# Patient Record
Sex: MALE | Race: WHITE | Employment: OTHER | URBAN - METROPOLITAN AREA
[De-identification: names, ages, dates, MRNs, and addresses within clinical notes are randomized per-mention and may not be internally consistent; named-entity substitution may affect disease eponyms.]

---

## 2019-08-29 ENCOUNTER — APPOINTMENT (OUTPATIENT)
Dept: GENERAL RADIOLOGY | Age: 64
End: 2019-08-29
Payer: COMMERCIAL

## 2019-08-29 ENCOUNTER — HOSPITAL ENCOUNTER (EMERGENCY)
Age: 64
Discharge: HOME | End: 2019-08-29
Attending: EMERGENCY MEDICINE
Payer: COMMERCIAL

## 2019-08-29 VITALS
SYSTOLIC BLOOD PRESSURE: 154 MMHG | BODY MASS INDEX: 26.15 KG/M2 | OXYGEN SATURATION: 97 % | RESPIRATION RATE: 22 BRPM | WEIGHT: 186.8 LBS | HEIGHT: 71 IN | DIASTOLIC BLOOD PRESSURE: 87 MMHG | HEART RATE: 92 BPM | TEMPERATURE: 97.9 F

## 2019-08-29 DIAGNOSIS — J44.1 COPD EXACERBATION (HCC): Primary | ICD-10-CM

## 2019-08-29 DIAGNOSIS — E86.0 DEHYDRATION: ICD-10-CM

## 2019-08-29 LAB
ANION GAP SERPL CALCULATED.3IONS-SCNC: 16 MMOL/L (ref 3–16)
BASE EXCESS VENOUS: -0.1 MMOL/L (ref -2–3)
BASOPHILS ABSOLUTE: 0.1 K/UL (ref 0–0.2)
BASOPHILS RELATIVE PERCENT: 0.8 %
BUN BLDV-MCNC: 14 MG/DL (ref 7–20)
CALCIUM SERPL-MCNC: 9.8 MG/DL (ref 8.3–10.6)
CARBOXYHEMOGLOBIN: 4.2 % (ref 0–1.5)
CHLORIDE BLD-SCNC: 100 MMOL/L (ref 99–110)
CO2: 20 MMOL/L (ref 21–32)
CREAT SERPL-MCNC: 0.8 MG/DL (ref 0.8–1.3)
EKG ATRIAL RATE: 117 BPM
EKG DIAGNOSIS: NORMAL
EKG P AXIS: 81 DEGREES
EKG P-R INTERVAL: 156 MS
EKG Q-T INTERVAL: 316 MS
EKG QRS DURATION: 78 MS
EKG QTC CALCULATION (BAZETT): 440 MS
EKG R AXIS: 5 DEGREES
EKG T AXIS: 70 DEGREES
EKG VENTRICULAR RATE: 117 BPM
EOSINOPHILS ABSOLUTE: 0.1 K/UL (ref 0–0.6)
EOSINOPHILS RELATIVE PERCENT: 1 %
GFR AFRICAN AMERICAN: >60
GFR NON-AFRICAN AMERICAN: >60
GLUCOSE BLD-MCNC: 110 MG/DL (ref 70–99)
HCO3 VENOUS: 20.4 MMOL/L (ref 24–28)
HCT VFR BLD CALC: 50.1 % (ref 40.5–52.5)
HEMOGLOBIN, VEN, REDUCED: 5.1 %
HEMOGLOBIN: 17.2 G/DL (ref 13.5–17.5)
LACTIC ACID: 2 MMOL/L (ref 0.4–2)
LYMPHOCYTES ABSOLUTE: 2.4 K/UL (ref 1–5.1)
LYMPHOCYTES RELATIVE PERCENT: 23.6 %
MCH RBC QN AUTO: 32.2 PG (ref 26–34)
MCHC RBC AUTO-ENTMCNC: 34.4 G/DL (ref 31–36)
MCV RBC AUTO: 93.8 FL (ref 80–100)
METHEMOGLOBIN VENOUS: 0.3 % (ref 0–1.5)
MONOCYTES ABSOLUTE: 0.9 K/UL (ref 0–1.3)
MONOCYTES RELATIVE PERCENT: 9.2 %
NEUTROPHILS ABSOLUTE: 6.8 K/UL (ref 1.7–7.7)
NEUTROPHILS RELATIVE PERCENT: 65.4 %
O2 SAT, VEN: 95 %
PCO2, VEN: 24.6 MMHG (ref 41–51)
PDW BLD-RTO: 13.5 % (ref 12.4–15.4)
PH VENOUS: 7.53 (ref 7.35–7.45)
PLATELET # BLD: 343 K/UL (ref 135–450)
PMV BLD AUTO: 6.8 FL (ref 5–10.5)
PO2, VEN: 63 MMHG (ref 25–40)
POTASSIUM SERPL-SCNC: 3.5 MMOL/L (ref 3.5–5.1)
PRO-BNP: 279 PG/ML (ref 0–124)
RBC # BLD: 5.34 M/UL (ref 4.2–5.9)
SODIUM BLD-SCNC: 136 MMOL/L (ref 136–145)
TCO2 CALC VENOUS: 21 MMOL/L
TROPONIN: <0.01 NG/ML
WBC # BLD: 10.3 K/UL (ref 4–11)

## 2019-08-29 PROCEDURE — 83605 ASSAY OF LACTIC ACID: CPT

## 2019-08-29 PROCEDURE — 80048 BASIC METABOLIC PNL TOTAL CA: CPT

## 2019-08-29 PROCEDURE — 99285 EMERGENCY DEPT VISIT HI MDM: CPT

## 2019-08-29 PROCEDURE — 71046 X-RAY EXAM CHEST 2 VIEWS: CPT

## 2019-08-29 PROCEDURE — 85025 COMPLETE CBC W/AUTO DIFF WBC: CPT

## 2019-08-29 PROCEDURE — 6370000000 HC RX 637 (ALT 250 FOR IP): Performed by: EMERGENCY MEDICINE

## 2019-08-29 PROCEDURE — 6370000000 HC RX 637 (ALT 250 FOR IP): Performed by: STUDENT IN AN ORGANIZED HEALTH CARE EDUCATION/TRAINING PROGRAM

## 2019-08-29 PROCEDURE — 82803 BLOOD GASES ANY COMBINATION: CPT

## 2019-08-29 PROCEDURE — 84484 ASSAY OF TROPONIN QUANT: CPT

## 2019-08-29 PROCEDURE — 83880 ASSAY OF NATRIURETIC PEPTIDE: CPT

## 2019-08-29 PROCEDURE — 96360 HYDRATION IV INFUSION INIT: CPT

## 2019-08-29 PROCEDURE — 93005 ELECTROCARDIOGRAM TRACING: CPT | Performed by: EMERGENCY MEDICINE

## 2019-08-29 PROCEDURE — 94640 AIRWAY INHALATION TREATMENT: CPT

## 2019-08-29 PROCEDURE — 2580000003 HC RX 258: Performed by: STUDENT IN AN ORGANIZED HEALTH CARE EDUCATION/TRAINING PROGRAM

## 2019-08-29 RX ORDER — AZITHROMYCIN 250 MG/1
250 TABLET, FILM COATED ORAL DAILY
Qty: 4 TABLET | Refills: 0 | Status: SHIPPED | OUTPATIENT
Start: 2019-08-29 | End: 2019-09-02

## 2019-08-29 RX ORDER — PREDNISONE 10 MG/1
TABLET ORAL
Qty: 20 TABLET | Refills: 0 | Status: SHIPPED | OUTPATIENT
Start: 2019-08-29 | End: 2019-09-08

## 2019-08-29 RX ORDER — SODIUM CHLORIDE, SODIUM LACTATE, POTASSIUM CHLORIDE, CALCIUM CHLORIDE 600; 310; 30; 20 MG/100ML; MG/100ML; MG/100ML; MG/100ML
1000 INJECTION, SOLUTION INTRAVENOUS ONCE
Status: COMPLETED | OUTPATIENT
Start: 2019-08-29 | End: 2019-08-29

## 2019-08-29 RX ORDER — ALBUTEROL SULFATE 90 UG/1
2 AEROSOL, METERED RESPIRATORY (INHALATION) EVERY 4 HOURS PRN
Qty: 1 INHALER | Refills: 0 | Status: SHIPPED | OUTPATIENT
Start: 2019-08-29

## 2019-08-29 RX ORDER — PREDNISONE 20 MG/1
60 TABLET ORAL ONCE
Status: DISCONTINUED | OUTPATIENT
Start: 2019-08-29 | End: 2019-08-29

## 2019-08-29 RX ORDER — AZITHROMYCIN 250 MG/1
500 TABLET, FILM COATED ORAL ONCE
Status: COMPLETED | OUTPATIENT
Start: 2019-08-29 | End: 2019-08-29

## 2019-08-29 RX ORDER — PREDNISONE 10 MG/1
10 TABLET ORAL ONCE
Status: COMPLETED | OUTPATIENT
Start: 2019-08-29 | End: 2019-08-29

## 2019-08-29 RX ORDER — IPRATROPIUM BROMIDE AND ALBUTEROL SULFATE 2.5; .5 MG/3ML; MG/3ML
1 SOLUTION RESPIRATORY (INHALATION) ONCE
Status: COMPLETED | OUTPATIENT
Start: 2019-08-29 | End: 2019-08-29

## 2019-08-29 RX ADMIN — IPRATROPIUM BROMIDE AND ALBUTEROL SULFATE 1 AMPULE: .5; 3 SOLUTION RESPIRATORY (INHALATION) at 11:05

## 2019-08-29 RX ADMIN — PREDNISONE 10 MG: 10 TABLET ORAL at 11:18

## 2019-08-29 RX ADMIN — SODIUM CHLORIDE, POTASSIUM CHLORIDE, SODIUM LACTATE AND CALCIUM CHLORIDE 1000 ML: 600; 310; 30; 20 INJECTION, SOLUTION INTRAVENOUS at 11:18

## 2019-08-29 RX ADMIN — AZITHROMYCIN 500 MG: 250 TABLET, FILM COATED ORAL at 12:45

## 2019-08-29 ASSESSMENT — ENCOUNTER SYMPTOMS
COUGH: 1
CHEST TIGHTNESS: 1
EYES NEGATIVE: 1
NAUSEA: 1
SHORTNESS OF BREATH: 1
VOMITING: 1
CONSTIPATION: 1
ABDOMINAL PAIN: 1

## 2019-08-29 NOTE — ED PROVIDER NOTES
Sig: Take 1 tablet by mouth daily for 4 days Next dose tomorrow 8/30     Dispense:  4 tablet     Refill:  0    albuterol sulfate HFA (PROVENTIL HFA) 108 (90 Base) MCG/ACT inhaler     Sig: Inhale 2 puffs into the lungs every 4 hours as needed for Wheezing or Shortness of Breath (Space out to every 6 hours as symptoms improve) Space out to every 6 hours as symptoms improve. Dispense:  1 Inhaler     Refill:  0    predniSONE (DELTASONE) 10 MG tablet     Sig: Take 4 tablets by mouth once daily for 4 days. Next dose tomorrow 8/30     Dispense:  20 tablet     Refill:  0       CONSULTS:  None    MEDICAL DECISION MAKING / ASSESSMENT / Princess Simeon is a 62 y.o. male with a variety of complaints. His presentation is most concerning for abnormal breath sounds including wheezing in the context of a long history of smoking. Additionally his GI complaints including anorexia, vomiting, constipation are concerning for his nutritional status as well as metabolic sequelae. ED course would be directed towards symptomatic control including treating his wheezing. A search for a unifying etiology will also be attempted including basic lab work and a chest x-ray. He is noted to trigger sepsis criteria however there is little evidence of an infectious etiology at this time. He will be given IV fluids and support of his general hydration status. ED Course as of Aug 29 1243   Thu Aug 29, 2019   1128 His initial lab work is resulted. His VBG is notable for alkalemia likely contraction alkalosis. His BNP is elevated; his troponin is negative. There is no leukocytosis on his CBC he is hemoconcentrated; his BMP is without actionable abnormality. [NG]   7625 His chest x-ray is resulted without evidence of pneumonia or pulmonary edema. [NG]   5137 He is reassessed. He says that he is feeling better. His heart rate is noted to be normalizing with rate in the high 90s.     [NG]   4639 His condition is improved and

## 2019-08-29 NOTE — ED NOTES
IV placed. Medication given per order. Will continue to monitor. Pt states that he is breathing better after breathing treatment.       Julieta Kellogg RN  08/29/19 8119

## 2020-02-09 ENCOUNTER — HOSPITAL ENCOUNTER (INPATIENT)
Age: 65
LOS: 2 days | Discharge: HOSPICE | DRG: 054 | End: 2020-02-12
Attending: EMERGENCY MEDICINE | Admitting: INTERNAL MEDICINE
Payer: COMMERCIAL

## 2020-02-09 ENCOUNTER — APPOINTMENT (OUTPATIENT)
Dept: GENERAL RADIOLOGY | Age: 65
DRG: 054 | End: 2020-02-09
Payer: COMMERCIAL

## 2020-02-09 PROBLEM — N39.0 UTI (URINARY TRACT INFECTION): Status: ACTIVE | Noted: 2020-02-09

## 2020-02-09 LAB
A/G RATIO: 1.1 (ref 1.1–2.2)
ALBUMIN SERPL-MCNC: 3.9 G/DL (ref 3.4–5)
ALP BLD-CCNC: 71 U/L (ref 40–129)
ALT SERPL-CCNC: 21 U/L (ref 10–40)
AMORPHOUS: ABNORMAL /HPF
ANION GAP SERPL CALCULATED.3IONS-SCNC: 18 MMOL/L (ref 3–16)
AST SERPL-CCNC: 22 U/L (ref 15–37)
BACTERIA: ABNORMAL /HPF
BANDED NEUTROPHILS RELATIVE PERCENT: 3 % (ref 0–7)
BASOPHILS ABSOLUTE: 0 K/UL (ref 0–0.2)
BASOPHILS RELATIVE PERCENT: 0 %
BILIRUB SERPL-MCNC: 0.5 MG/DL (ref 0–1)
BILIRUBIN URINE: ABNORMAL
BLOOD, URINE: ABNORMAL
BUN BLDV-MCNC: 33 MG/DL (ref 7–20)
CALCIUM SERPL-MCNC: 9.9 MG/DL (ref 8.3–10.6)
CHLORIDE BLD-SCNC: 112 MMOL/L (ref 99–110)
CLARITY: CLEAR
CO2: 19 MMOL/L (ref 21–32)
COLOR: YELLOW
CREAT SERPL-MCNC: 0.8 MG/DL (ref 0.8–1.3)
CRYSTALS, UA: ABNORMAL /HPF
EOSINOPHILS ABSOLUTE: 0 K/UL (ref 0–0.6)
EOSINOPHILS RELATIVE PERCENT: 0 %
EPITHELIAL CELLS, UA: ABNORMAL /HPF
GFR AFRICAN AMERICAN: >60
GFR NON-AFRICAN AMERICAN: >60
GLOBULIN: 3.4 G/DL
GLUCOSE BLD-MCNC: 121 MG/DL (ref 70–99)
GLUCOSE URINE: NEGATIVE MG/DL
HCT VFR BLD CALC: 42.7 % (ref 40.5–52.5)
HEMOGLOBIN: 14 G/DL (ref 13.5–17.5)
KETONES, URINE: NEGATIVE MG/DL
LACTIC ACID, SEPSIS: 1 MMOL/L (ref 0.4–1.9)
LACTIC ACID, SEPSIS: 1.1 MMOL/L (ref 0.4–1.9)
LACTIC ACID: 1.3 MMOL/L (ref 0.4–2)
LEUKOCYTE ESTERASE, URINE: ABNORMAL
LYMPHOCYTES ABSOLUTE: 0.8 K/UL (ref 1–5.1)
LYMPHOCYTES RELATIVE PERCENT: 6 %
MCH RBC QN AUTO: 33.9 PG (ref 26–34)
MCHC RBC AUTO-ENTMCNC: 32.8 G/DL (ref 31–36)
MCV RBC AUTO: 103.2 FL (ref 80–100)
MICROSCOPIC EXAMINATION: YES
MONOCYTES ABSOLUTE: 0.8 K/UL (ref 0–1.3)
MONOCYTES RELATIVE PERCENT: 6 %
MUCUS: ABNORMAL /LPF
MYELOCYTE PERCENT: 2 %
NEUTROPHILS ABSOLUTE: 12.2 K/UL (ref 1.7–7.7)
NEUTROPHILS RELATIVE PERCENT: 83 %
NITRITE, URINE: NEGATIVE
PDW BLD-RTO: 15.2 % (ref 12.4–15.4)
PH UA: 5.5 (ref 5–8)
PLATELET # BLD: 303 K/UL (ref 135–450)
PMV BLD AUTO: 7.8 FL (ref 5–10.5)
POTASSIUM REFLEX MAGNESIUM: 3.9 MMOL/L (ref 3.5–5.1)
PROTEIN UA: ABNORMAL MG/DL
RBC # BLD: 4.13 M/UL (ref 4.2–5.9)
RBC UA: ABNORMAL /HPF (ref 0–2)
SODIUM BLD-SCNC: 149 MMOL/L (ref 136–145)
SPECIFIC GRAVITY UA: >=1.03 (ref 1–1.03)
TOTAL PROTEIN: 7.3 G/DL (ref 6.4–8.2)
URINE REFLEX TO CULTURE: YES
URINE TYPE: ABNORMAL
UROBILINOGEN, URINE: 0.2 E.U./DL
WBC # BLD: 13.9 K/UL (ref 4–11)
WBC UA: ABNORMAL /HPF (ref 0–5)
YEAST: PRESENT /HPF

## 2020-02-09 PROCEDURE — 96365 THER/PROPH/DIAG IV INF INIT: CPT

## 2020-02-09 PROCEDURE — 80053 COMPREHEN METABOLIC PANEL: CPT

## 2020-02-09 PROCEDURE — 2580000003 HC RX 258: Performed by: INTERNAL MEDICINE

## 2020-02-09 PROCEDURE — 96360 HYDRATION IV INFUSION INIT: CPT

## 2020-02-09 PROCEDURE — 96361 HYDRATE IV INFUSION ADD-ON: CPT

## 2020-02-09 PROCEDURE — 83605 ASSAY OF LACTIC ACID: CPT

## 2020-02-09 PROCEDURE — 99285 EMERGENCY DEPT VISIT HI MDM: CPT

## 2020-02-09 PROCEDURE — 85025 COMPLETE CBC W/AUTO DIFF WBC: CPT

## 2020-02-09 PROCEDURE — 87086 URINE CULTURE/COLONY COUNT: CPT

## 2020-02-09 PROCEDURE — 71046 X-RAY EXAM CHEST 2 VIEWS: CPT

## 2020-02-09 PROCEDURE — 2580000003 HC RX 258: Performed by: EMERGENCY MEDICINE

## 2020-02-09 PROCEDURE — 36415 COLL VENOUS BLD VENIPUNCTURE: CPT

## 2020-02-09 PROCEDURE — G0378 HOSPITAL OBSERVATION PER HR: HCPCS

## 2020-02-09 PROCEDURE — 6360000002 HC RX W HCPCS: Performed by: INTERNAL MEDICINE

## 2020-02-09 PROCEDURE — 96368 THER/DIAG CONCURRENT INF: CPT

## 2020-02-09 PROCEDURE — 87040 BLOOD CULTURE FOR BACTERIA: CPT

## 2020-02-09 PROCEDURE — 96366 THER/PROPH/DIAG IV INF ADDON: CPT

## 2020-02-09 PROCEDURE — 96372 THER/PROPH/DIAG INJ SC/IM: CPT

## 2020-02-09 PROCEDURE — 51702 INSERT TEMP BLADDER CATH: CPT

## 2020-02-09 PROCEDURE — 6360000002 HC RX W HCPCS: Performed by: EMERGENCY MEDICINE

## 2020-02-09 PROCEDURE — 81001 URINALYSIS AUTO W/SCOPE: CPT

## 2020-02-09 PROCEDURE — 96367 TX/PROPH/DG ADDL SEQ IV INF: CPT

## 2020-02-09 RX ORDER — DOXYCYCLINE HYCLATE 100 MG
100 TABLET ORAL 2 TIMES DAILY
Status: ON HOLD | COMMUNITY
End: 2020-02-12 | Stop reason: HOSPADM

## 2020-02-09 RX ORDER — 0.9 % SODIUM CHLORIDE 0.9 %
1000 INTRAVENOUS SOLUTION INTRAVENOUS ONCE
Status: COMPLETED | OUTPATIENT
Start: 2020-02-09 | End: 2020-02-09

## 2020-02-09 RX ORDER — OMEPRAZOLE 20 MG/1
20 CAPSULE, DELAYED RELEASE ORAL DAILY
Status: ON HOLD | COMMUNITY
End: 2020-02-12 | Stop reason: HOSPADM

## 2020-02-09 RX ORDER — ACETAMINOPHEN 325 MG/1
650 TABLET ORAL EVERY 4 HOURS PRN
Status: DISCONTINUED | OUTPATIENT
Start: 2020-02-09 | End: 2020-02-13 | Stop reason: HOSPADM

## 2020-02-09 RX ORDER — TAMSULOSIN HYDROCHLORIDE 0.4 MG/1
0.4 CAPSULE ORAL DAILY
Status: ON HOLD | COMMUNITY
End: 2020-02-12 | Stop reason: HOSPADM

## 2020-02-09 RX ORDER — SODIUM CHLORIDE 0.9 % (FLUSH) 0.9 %
10 SYRINGE (ML) INJECTION EVERY 12 HOURS SCHEDULED
Status: DISCONTINUED | OUTPATIENT
Start: 2020-02-09 | End: 2020-02-09

## 2020-02-09 RX ORDER — QUETIAPINE FUMARATE 25 MG/1
25 TABLET, FILM COATED ORAL NIGHTLY PRN
Status: ON HOLD | COMMUNITY
End: 2020-02-12 | Stop reason: HOSPADM

## 2020-02-09 RX ORDER — MEGESTROL ACETATE 40 MG/ML
200 SUSPENSION ORAL 2 TIMES DAILY
COMMUNITY

## 2020-02-09 RX ORDER — DULOXETIN HYDROCHLORIDE 60 MG/1
60 CAPSULE, DELAYED RELEASE ORAL DAILY
Status: ON HOLD | COMMUNITY
End: 2020-02-12 | Stop reason: HOSPADM

## 2020-02-09 RX ORDER — ONDANSETRON 2 MG/ML
4 INJECTION INTRAMUSCULAR; INTRAVENOUS EVERY 6 HOURS PRN
Status: DISCONTINUED | OUTPATIENT
Start: 2020-02-09 | End: 2020-02-13 | Stop reason: HOSPADM

## 2020-02-09 RX ORDER — SODIUM CHLORIDE 0.9 % (FLUSH) 0.9 %
10 SYRINGE (ML) INJECTION PRN
Status: DISCONTINUED | OUTPATIENT
Start: 2020-02-09 | End: 2020-02-09

## 2020-02-09 RX ORDER — SODIUM CHLORIDE 0.9 % (FLUSH) 0.9 %
10 SYRINGE (ML) INJECTION PRN
Status: DISCONTINUED | OUTPATIENT
Start: 2020-02-09 | End: 2020-02-13 | Stop reason: HOSPADM

## 2020-02-09 RX ORDER — SODIUM CHLORIDE 0.9 % (FLUSH) 0.9 %
10 SYRINGE (ML) INJECTION EVERY 12 HOURS SCHEDULED
Status: DISCONTINUED | OUTPATIENT
Start: 2020-02-09 | End: 2020-02-13 | Stop reason: HOSPADM

## 2020-02-09 RX ORDER — PROCHLORPERAZINE MALEATE 10 MG
10 TABLET ORAL EVERY 4 HOURS PRN
Status: ON HOLD | COMMUNITY
End: 2020-02-12 | Stop reason: HOSPADM

## 2020-02-09 RX ORDER — SODIUM CHLORIDE 450 MG/100ML
INJECTION, SOLUTION INTRAVENOUS CONTINUOUS
Status: DISCONTINUED | OUTPATIENT
Start: 2020-02-09 | End: 2020-02-10

## 2020-02-09 RX ORDER — SODIUM CHLORIDE 9 MG/ML
INJECTION, SOLUTION INTRAVENOUS CONTINUOUS
Status: DISCONTINUED | OUTPATIENT
Start: 2020-02-09 | End: 2020-02-09

## 2020-02-09 RX ADMIN — SODIUM CHLORIDE 1000 ML: 9 INJECTION, SOLUTION INTRAVENOUS at 14:05

## 2020-02-09 RX ADMIN — VANCOMYCIN HYDROCHLORIDE 1000 MG: 10 INJECTION, POWDER, LYOPHILIZED, FOR SOLUTION INTRAVENOUS at 22:32

## 2020-02-09 RX ADMIN — PIPERACILLIN AND TAZOBACTAM 3.38 G: 3; .375 INJECTION, POWDER, LYOPHILIZED, FOR SOLUTION INTRAVENOUS at 22:31

## 2020-02-09 RX ADMIN — SODIUM CHLORIDE: 4.5 INJECTION, SOLUTION INTRAVENOUS at 21:22

## 2020-02-09 RX ADMIN — SODIUM CHLORIDE 1000 ML: 9 INJECTION, SOLUTION INTRAVENOUS at 19:46

## 2020-02-09 RX ADMIN — DEXTROSE MONOHYDRATE 1 G: 5 INJECTION INTRAVENOUS at 17:05

## 2020-02-09 RX ADMIN — Medication 10 ML: at 22:34

## 2020-02-09 RX ADMIN — ENOXAPARIN SODIUM 40 MG: 40 INJECTION SUBCUTANEOUS at 22:32

## 2020-02-09 ASSESSMENT — PAIN SCALES - WONG BAKER
WONGBAKER_NUMERICALRESPONSE: 8
WONGBAKER_NUMERICALRESPONSE: 4

## 2020-02-09 ASSESSMENT — ENCOUNTER SYMPTOMS
DIARRHEA: 0
COUGH: 1
VOMITING: 0
NAUSEA: 0
ABDOMINAL PAIN: 0
SHORTNESS OF BREATH: 0

## 2020-02-09 ASSESSMENT — PAIN DESCRIPTION - PAIN TYPE: TYPE: ACUTE PAIN

## 2020-02-09 ASSESSMENT — PAIN DESCRIPTION - ONSET: ONSET: ON-GOING

## 2020-02-09 ASSESSMENT — PAIN - FUNCTIONAL ASSESSMENT: PAIN_FUNCTIONAL_ASSESSMENT: PREVENTS OR INTERFERES SOME ACTIVE ACTIVITIES AND ADLS

## 2020-02-09 ASSESSMENT — PAIN DESCRIPTION - ORIENTATION: ORIENTATION: MID

## 2020-02-09 ASSESSMENT — PAIN DESCRIPTION - LOCATION: LOCATION: ABDOMEN

## 2020-02-09 ASSESSMENT — PAIN DESCRIPTION - DESCRIPTORS: DESCRIPTORS: CONSTANT

## 2020-02-09 ASSESSMENT — PAIN SCALES - GENERAL
PAINLEVEL_OUTOF10: 8
PAINLEVEL_OUTOF10: 0

## 2020-02-09 ASSESSMENT — PAIN DESCRIPTION - PROGRESSION: CLINICAL_PROGRESSION: GRADUALLY WORSENING

## 2020-02-09 ASSESSMENT — PAIN DESCRIPTION - FREQUENCY: FREQUENCY: CONTINUOUS

## 2020-02-09 NOTE — ED PROVIDER NOTES
[address]          ATTENDING PHYSICIAN NOTE       Date of evaluation: 2/9/2020    Chief Complaint     Abdominal Pain (mid abominal pain; no appetite; from [facility]; hx lung cancer)      History of Present Illness     Elisabeth Coker is a 62 y.o. male history of metastatic lung cancer who presents from his nursing home with worsening p.o. intake, increasing weakness, and increasing lethargy. The patient was just released from [facility] 2 days ago to his nursing home where he was admitted for quite some time for the same symptoms and pneumonia with sepsis was diagnosed and he was on prolonged antibiotics. Apparently there was a consultation with palliative care and possibly hospice but nothing more was done. Family is not sure what to expect at this point in time in terms of prognosis. The patient himself on arrival had mentioned something about abdominal pain but when I asked him, he denies abdominal pain and really only states that he is weak and fatigued. Review of Systems     Review of Systems   Constitutional: Positive for appetite change and fatigue. Negative for chills and fever. Respiratory: Positive for cough. Negative for shortness of breath. Cardiovascular: Negative for chest pain. Gastrointestinal: Negative for abdominal pain, diarrhea, nausea and vomiting. Neurological: Positive for weakness. All other systems reviewed and are negative. Past Medical, Surgical, Family, and Social History     He has a past medical history of Cancer ([address] He has a past surgical history that includes hip surgery. His family history is not on file. He reports that he has quit smoking. His smoking use included cigarettes. He smoked 1.00 pack per day. He has never used smokeless tobacco. He reports previous alcohol use. He reports current drug use. Drug: Marijuana.     Medications     Previous Medications    ALBUTEROL SULFATE HFA (PROVENTIL HFA) 108 (90 BASE) MCG/ACT INHALER    Inhale 2 puffs into the lungs every 4 hours as needed for Wheezing or Shortness of Breath (Space out to every 6 hours as symptoms improve) Space out to every 6 hours as symptoms improve. ASPIRIN 81 MG TABLET    Take 81 mg by mouth daily    DOXYCYCLINE HYCLATE (VIBRA-TABS) 100 MG TABLET    Take 100 mg by mouth 2 times daily    DULOXETINE (CYMBALTA) 60 MG EXTENDED RELEASE CAPSULE    Take 60 mg by mouth daily    MEGESTROL (MEGACE) 40 MG/ML SUSPENSION    Take 200 mg by mouth 2 times daily    OMEPRAZOLE (PRILOSEC) 20 MG DELAYED RELEASE CAPSULE    Take 20 mg by mouth Daily    PROCHLORPERAZINE (COMPAZINE) 10 MG TABLET    Take 10 mg by mouth every 4 hours as needed    QUETIAPINE (SEROQUEL) 25 MG TABLET    Take 25 mg by mouth nightly as needed    SERTRALINE (ZOLOFT) 50 MG TABLET    Take 50 mg by mouth daily    TAMSULOSIN (FLOMAX) 0.4 MG CAPSULE    Take 0.4 mg by mouth daily       Allergies     He has No Known Allergies. Physical Exam     INITIAL VITALS: BP: 107/82, Temp: 97.7 °F (36.5 °C), Pulse: 117, Resp: 22, SpO2: 99 %   Physical Exam  Vitals signs and nursing note reviewed. Constitutional:       General: He is not in acute distress. Appearance: He is well-developed. He is not diaphoretic. Neck:      Musculoskeletal: Normal range of motion. Cardiovascular:      Rate and Rhythm: Regular rhythm. Tachycardia present. Pulmonary:      Effort: Pulmonary effort is normal.      Breath sounds: Normal breath sounds. Abdominal:      General: Bowel sounds are normal. There is no distension. Palpations: Abdomen is soft. Tenderness: There is no abdominal tenderness. Skin:     General: Skin is warm and dry. Neurological:      Mental Status: He is oriented to person, place, and time. He is lethargic. Psychiatric:         Behavior: Behavior normal.         Diagnostic Results     RADIOLOGY:  XR CHEST STANDARD (2 VW)   Final Result      1. No acute disease. 8.3 - 10.6 mg/dL    Total Protein 7.3 6.4 - 8.2 g/dL    Alb 3.9 3.4 - 5.0 g/dL    Albumin/Globulin Ratio 1.1 1.1 - 2.2    Total Bilirubin 0.5 0.0 - 1.0 mg/dL    Alkaline Phosphatase 71 40 - 129 U/L    ALT 21 10 - 40 U/L    AST 22 15 - 37 U/L    Globulin 3.4 g/dL   Microscopic Urinalysis   Result Value Ref Range    Mucus, UA 2+ (A) /LPF    WBC, UA 10-20 (A) 0 - 5 /HPF    RBC, UA 10-20 (A) 0 - 2 /HPF    Epi Cells 0-2 /HPF    Bacteria, UA 2+ (A) /HPF    Amorphous, UA 1+ /HPF    Yeast, UA Present (A) /HPF    Crystals Few Ca. Oxalate (A) /HPF     RECENT VITALS:  BP: 113/84,Temp: 97.7 °F (36.5 °C), Pulse: 110, Resp: 30, SpO2: 99 %       ED Course     Nursing Notes, Past Medical Hx, Past Surgical Hx, Social Hx,Allergies, and Family Hx were reviewed. patient was given the following medications:  Orders Placed This Encounter   Medications    0.9 % sodium chloride bolus    cefTRIAXone (ROCEPHIN) 1 g IVPB in 50 mL D5W minibag       CONSULTS:  IP CONSULT TO HOSPITALIST    MEDICAL DECISIONMAKING / ASSESSMENT / Sherrie Carter is a 62 y.o. male presenting with declining mental status and failure to thrive with lack of p.o. intake over the last 2 days after being placed in a nursing home for rehab after an admission for sepsis from pneumonia at [facility]. Patient also has underlying lung cancer with mets to brain and liver. He was discharged from there 2 days ago. He had issues there apparently with urinary retention and a Dunlap catheter was placed and he failed a trial of removal.  He went there with an indwelling Dunlap. He now has developed evidence of urinary tract infection with an increasing white blood cell count and I feel at this point needs to be treated with different antibiotics for the urinary tract infection. In addition he is not eating or drinking and is becoming dehydrated and will need IV fluids.   We will bring him into the hospital.  After discussion with family it was determined the patient

## 2020-02-10 ENCOUNTER — APPOINTMENT (OUTPATIENT)
Dept: GENERAL RADIOLOGY | Age: 65
DRG: 054 | End: 2020-02-10
Payer: COMMERCIAL

## 2020-02-10 PROBLEM — G93.41 ACUTE METABOLIC ENCEPHALOPATHY: Status: ACTIVE | Noted: 2020-02-10

## 2020-02-10 LAB
ANION GAP SERPL CALCULATED.3IONS-SCNC: 15 MMOL/L (ref 3–16)
BUN BLDV-MCNC: 27 MG/DL (ref 7–20)
CALCIUM SERPL-MCNC: 9 MG/DL (ref 8.3–10.6)
CHLORIDE BLD-SCNC: 113 MMOL/L (ref 99–110)
CO2: 20 MMOL/L (ref 21–32)
CREAT SERPL-MCNC: 0.7 MG/DL (ref 0.8–1.3)
GFR AFRICAN AMERICAN: >60
GFR NON-AFRICAN AMERICAN: >60
GLUCOSE BLD-MCNC: 132 MG/DL (ref 70–99)
GLUCOSE BLD-MCNC: 90 MG/DL (ref 70–99)
HCT VFR BLD CALC: 33.7 % (ref 40.5–52.5)
HEMOGLOBIN: 11.3 G/DL (ref 13.5–17.5)
MAGNESIUM: 2 MG/DL (ref 1.8–2.4)
MCH RBC QN AUTO: 34.3 PG (ref 26–34)
MCHC RBC AUTO-ENTMCNC: 33.5 G/DL (ref 31–36)
MCV RBC AUTO: 102.5 FL (ref 80–100)
ORGANISM: ABNORMAL
PDW BLD-RTO: 14.9 % (ref 12.4–15.4)
PERFORMED ON: ABNORMAL
PHOSPHORUS: 4.1 MG/DL (ref 2.5–4.9)
PLATELET # BLD: 238 K/UL (ref 135–450)
PMV BLD AUTO: 7.6 FL (ref 5–10.5)
POTASSIUM REFLEX MAGNESIUM: 3.7 MMOL/L (ref 3.5–5.1)
RBC # BLD: 3.29 M/UL (ref 4.2–5.9)
SODIUM BLD-SCNC: 148 MMOL/L (ref 136–145)
URINE CULTURE, ROUTINE: ABNORMAL
WBC # BLD: 10.1 K/UL (ref 4–11)

## 2020-02-10 PROCEDURE — 84100 ASSAY OF PHOSPHORUS: CPT

## 2020-02-10 PROCEDURE — 92610 EVALUATE SWALLOWING FUNCTION: CPT

## 2020-02-10 PROCEDURE — 1200000000 HC SEMI PRIVATE

## 2020-02-10 PROCEDURE — 85027 COMPLETE CBC AUTOMATED: CPT

## 2020-02-10 PROCEDURE — 2580000003 HC RX 258: Performed by: INTERNAL MEDICINE

## 2020-02-10 PROCEDURE — 83735 ASSAY OF MAGNESIUM: CPT

## 2020-02-10 PROCEDURE — 99254 IP/OBS CNSLTJ NEW/EST MOD 60: CPT | Performed by: INTERNAL MEDICINE

## 2020-02-10 PROCEDURE — 96366 THER/PROPH/DIAG IV INF ADDON: CPT

## 2020-02-10 PROCEDURE — 36415 COLL VENOUS BLD VENIPUNCTURE: CPT

## 2020-02-10 PROCEDURE — 6360000002 HC RX W HCPCS: Performed by: INTERNAL MEDICINE

## 2020-02-10 PROCEDURE — 80048 BASIC METABOLIC PNL TOTAL CA: CPT

## 2020-02-10 PROCEDURE — 99221 1ST HOSP IP/OBS SF/LOW 40: CPT | Performed by: NURSE PRACTITIONER

## 2020-02-10 RX ORDER — DEXTROSE MONOHYDRATE 50 MG/ML
INJECTION, SOLUTION INTRAVENOUS CONTINUOUS
Status: DISPENSED | OUTPATIENT
Start: 2020-02-10 | End: 2020-02-11

## 2020-02-10 RX ADMIN — SODIUM CHLORIDE: 4.5 INJECTION, SOLUTION INTRAVENOUS at 10:36

## 2020-02-10 RX ADMIN — DEXTROSE MONOHYDRATE 80 ML/HR: 50 INJECTION, SOLUTION INTRAVENOUS at 13:56

## 2020-02-10 RX ADMIN — VANCOMYCIN HYDROCHLORIDE 1000 MG: 10 INJECTION, POWDER, LYOPHILIZED, FOR SOLUTION INTRAVENOUS at 10:36

## 2020-02-10 RX ADMIN — ENOXAPARIN SODIUM 40 MG: 40 INJECTION SUBCUTANEOUS at 10:35

## 2020-02-10 RX ADMIN — PIPERACILLIN AND TAZOBACTAM 3.38 G: 3; .375 INJECTION, POWDER, LYOPHILIZED, FOR SOLUTION INTRAVENOUS at 05:58

## 2020-02-10 RX ADMIN — Medication 10 ML: at 10:36

## 2020-02-10 RX ADMIN — PIPERACILLIN AND TAZOBACTAM 3.38 G: 3; .375 INJECTION, POWDER, LYOPHILIZED, FOR SOLUTION INTRAVENOUS at 13:55

## 2020-02-10 RX ADMIN — PIPERACILLIN AND TAZOBACTAM 3.38 G: 3; .375 INJECTION, POWDER, LYOPHILIZED, FOR SOLUTION INTRAVENOUS at 19:55

## 2020-02-10 ASSESSMENT — PAIN SCALES - GENERAL
PAINLEVEL_OUTOF10: 0
PAINLEVEL_OUTOF10: 0

## 2020-02-10 ASSESSMENT — PAIN SCALES - WONG BAKER: WONGBAKER_NUMERICALRESPONSE: 2

## 2020-02-10 NOTE — PROGRESS NOTES
Patient arrived from ED, alert, but confused. Left chest port saline locked. NS bolus started. Reviewed safety and plan of care with family. Dunlap draining yellow urine, family states present due to urinary retention. Family states patient should be DNR CC, message sent to Dr Camryn Lagos. Bed alarm on. Report given to PM nurse.

## 2020-02-10 NOTE — PLAN OF CARE
Problem: Falls - Risk of:  Goal: Will remain free from falls  Description  Will remain free from falls  Outcome: Ongoing   Pt has been absent of falls. Fall precautions in place. Bed is low and locked. 2/4 side rails in place. Nonskid socks on. Bed alarm on. Call light and bedside table within reach. Will continue to monitor. Problem: Pain:  Description  Pain management should include both nonpharmacologic and pharmacologic interventions. Goal: Pain level will decrease  Description  Pain level will decrease  Outcome: Ongoing   Pt response to stimuli when moved. Pt moans out occasionally. Will continue to monitor.

## 2020-02-10 NOTE — CONSULTS
3. SOB: Pt not able to state, does not appear tachypneic, no accessory muscle use. Lung sounds are clear. Pt resting comfortably on room air   4. Lethargy/Confusion: Potentially d/t UTI, medications, brain mets or dehydration. Family concerned that reversible causes were ruled out during previous admission at [address] Disposition: SNF vs Hospice    Reason for Consult:         __x___ Goals of Care  __x___ Code Status Discussion/Advanced Care Planning   __x___ Psychosocial/Family Support  _____ Symptom Management  _____ Other (Specify)    Requesting Physician: Dr. Maddison Ramirez:  Lethargy, confusion    History Obtained From:  electronic medical record    History of Present Illness:         Mr Yury Tinsley is a 63 yo male with PMH of lung cancer with mets to the brain, who was brought to the hospital by family from his nursing facility for increased lethargy and altered mental status. Of note he was recently discharged from an OSH for sepsis and PNA. He was also retaining urine and a tripp was inserted. UA suggestive of UTI.        Subjective:                     Past Medical History:        Diagnosis Date    Cancer [facility])        Past Surgical History:        Procedure Laterality Date    HIP SURGERY         Current Medications:    Current Facility-Administered Medications: sodium chloride flush 0.9 % injection 10 mL, 10 mL, Intravenous, 2 times per day  sodium chloride flush 0.9 % injection 10 mL, 10 mL, Intravenous, PRN  magnesium hydroxide (MILK OF MAGNESIA) 400 MG/5ML suspension 30 mL, 30 mL, Oral, Daily PRN  ondansetron (ZOFRAN) injection 4 mg, 4 mg, Intravenous, Q6H PRN  enoxaparin (LOVENOX) injection 40 mg, 40 mg, Subcutaneous, Daily  acetaminophen (TYLENOL) tablet 650 mg, 650 mg, Oral, Q4H PRN  piperacillin-tazobactam (ZOSYN) 3.375 g in dextrose 5 % 100 mL IVPB extended infusion (mini-bag), 3.375 g, Intravenous, Q8H  0.45 % sodium chloride infusion, , Intravenous, Continuous  vancomycin (VANCOCIN)

## 2020-02-10 NOTE — PROGRESS NOTES
commands. Responses to questions were slow. Volitional cough is weak, oral structures grossly intact, no asymmetry noted, oral cavity very dry. Presented pt with puree and thin liquids as well as a hawa cracker. Intermittent cough after water trials via tsp and cup. Good swallow movement upon palpation of anterior neck. Pt with minimal dentition/poor oral hygiene, exhibited slow mastication of cracker, no oral residue. Recommend MBS to fully assess swallow function secondary to possible overt signs of aspiration as above, as well as medical status    Dysphagia Outcome Severity Scale: Level 4: Mild moderate dysphagia- Intermittent supervision/cueing. One - two diet consistencies restricted     Treatment Plan  Requires SLP Intervention: Yes  Duration/Frequency of Treatment: TBD  D/C Recommendations: To be determined       Recommended Diet and Intervention  Diet  Recommendation: (TBD after MBS)  Recommended Form of Meds: Meds in puree  Recommendations: Modified barium swallow study  Therapeutic Interventions: Patient/Family education;Oral care    Compensatory Swallowing Strategies  Compensatory Swallowing Strategies: (TBD)    Treatment/Goals  1- Pt will participate in MBS  2- The patient Keisha Fallon will verbalize/demonstrate understanding of dysphagia recommendations  2/10: pt educated to purpose of visit and recommendation for MBS. Pt agreeable and stated understanding  Cont goal    General  Chart Reviewed: Yes  Behavior/Cognition: Cooperative  Respiratory Status: O2 via nasual cannula  Communication Observation: Functional(slow responses)  Follows Directions: Simple  Dentition: Some missing teeth;Poor dental/oral hygeine  Patient Positioning: Upright in bed  Baseline Vocal Quality: Weak  Volitional Cough: Weak  Prior Dysphagia History: none  Consistencies Administered: Reg solid; Dysphagia Pureed (Dysphagia I); Thin - teaspoon; Thin - cup; Thin - straw; Ice Chips    Vision/Hearing  Vision  Vision: Within Functional Limits  Hearing  Hearing: Within functional limits    Oral Motor Deficits  Oral/Motor  Oral Motor: (grossly intact)    Oral Phase Dysfunction  Pt with minimal dentition/poor oral hygiene, exhibited slow mastication of cracker, no oral residue. Indicators of Pharyngeal Phase Dysfunction  Presented pt with puree and thin liquids as well as a hawa cracker. Intermittent cough after water trials via tsp and cup. Good swallow movement upon palpation of anterior neck. Prognosis  Prognosis  Prognosis for safe diet advancement: fair  Barriers to reach goals: (co moribidities)  Individuals consulted  Consulted and agree with results and recommendations: Patient;RN    Education  Patient Education: pt educated to purpose of visit  Patient Education Response: Verbalizes understanding  Safety Devices in place: Yes  Type of devices: Call light within reach       Therapy Time  SLP Individual Minutes  Time In: 0815  Time Out: 0830  Minutes: 15     Plan:  Recommended diet:  TBD after MBS  MBS today  Pt therapy goal:  Not stated  Pt dc goal: not stated    Geronimo Falk M.S./[facility]-SLP #[id number]  Pg.  # [phone]  Needs met prior to leaving room, call light within reach, d/w ARUNA Mitchell  This document will serve as a dc summary if pt dc prior to next visit  2/10/2020 8:31 AM

## 2020-02-10 NOTE — CARE COORDINATION
Case Management Assessment           Initial Evaluation                Date / Time of Evaluation: 2/10/2020 3:37 PM                 Assessment Completed by: Vianca Orozco    Patient Name: Rocio Gonzáles     YOB: 1955  Diagnosis: UTI (urinary tract infection) [N39.0]  UTI (urinary tract infection) [V30.2]  Acute metabolic encephalopathy [S36.28]     Date / Time: 2/9/2020  1:12 PM    Patient Admission Status: Inpatient    If patient is discharged prior to next notation, then this note serves as note for discharge by case management. Current PCP: [address] [place] [address] Patient: No    Chart Reviewed: Yes  Patient/ Family Interviewed: Yes    Initial assessment completed at bedside with: pt and daughter    Hospitalization in the last 30 days: Yes    Emergency Contacts:  Extended Emergency Contact Information  Primary Emergency Contact: young, [address] [facility], [address] Phone: [phone]  Relation: Child  Secondary Emergency Contact: [address] [place] [address], [address] [facility],[address] Phone: [phone]  Relation: Child   needed? No    Advance Directives:   Code Status: DNR-CC    Healthcare Power of : No      Financial  Payor: Casilda Brittle / Plan: Sj Cox KIAN / Product Type: *No Product type* /     Pre-cert required for SNF: Yes    Pharmacy  No Pharmacies Listed    Potential assistance Purchasing Medications:    Does Patient want to participate in local refill/ meds to beds program?:      Meds To Beds General Rules:  1. Can ONLY be done Monday- Friday between 8:30am-5pm  2. Prescription(s) must be in pharmacy by 3pm to be filled same day  3. Copy of patient's insurance/ prescription drug card and patient face sheet must be sent along with the prescription(s)  4. Cost of Rx cannot be added to hospital bill. If financial assistance is needed, please contact unit  or ;  or  CANNOT provide pharmacy voucher for patients co-pays  5.  Patients can then  the prescription on their way out of the hospital at discharge, or pharmacy can deliver to the bedside if staff is available. (payment due at time of pick-up or delivery - cash, check, or card accepted)     Able to afford home medications/ co-pay costs: Yes    ADLS  Support Systems:      PT AM-PAC:   /24  OT AM-PAC:   /24    [place]: from [address] stay alone, recent stay at [facility]  Steps:     Plans to RETURN to current housing: No  Barriers to RETURNING to current housing: physical deconditioning/Hospice referral    Joann [address] 79  Currently ACTIVE with [address]: No  Home Care Agency: Not Applicable    Currently ACTIVE with [place] [facility] Aging: No      Durable Medical Equipment  DME Provider: none  Equipment:     Home Oxygen and [address] prior to admission: No  [address]: Not Applicable  Other Respiratory Equipment:       Dialysis  Active with HD/PD prior to admission: No  Nephrologist:     HD Center:  Not Applicable    DISCHARGE PLAN:  Disposition: SNF vs Hospice    Transportation PLAN for discharge: family     Factors facilitating achievement of predicted outcomes: Family support, Cooperative and Pleasant    Barriers to discharge: Medical complications    Additional Case Management Notes: Pt from home alone at [address] [facility] stay recent stay [address] [facility] pt and family do not want to return there. Pt referral sent to [address] [place] [address] family will meet with [address] [place] [address] Tuesday.        The Plan for Transition of Care is related to the following treatment goals of UTI (urinary tract infection) [N39.0]  UTI (urinary tract infection) [N60.9]  Acute metabolic encephalopathy [N61.71]    The Patient and/or patient representative Luh Rouse and his family were provided with a choice of provider and agrees with the discharge plan Yes    Freedom of choice list was provided with basic dialogue that supports the patient's individualized plan of care/goals and shares the quality data associated with the providers.  Yes      Care Transition patient: No    Vianca Orozco RN  [facility]  Case Management Department  Ph: [phone]   Fax: [phone]

## 2020-02-10 NOTE — PROGRESS NOTES
02/10/20  0550   WBC 13.9* 10.1   HGB 14.0 11.3*   HCT 42.7 33.7*    238     Recent Labs     02/09/20  1351 02/10/20  0550   * 148*   K 3.9 3.7   * 113*   CO2 19* 20*   BUN 33* 27*   CREATININE 0.8 0.7*   CALCIUM 9.9 9.0   PHOS  --  4.1     Recent Labs     02/09/20  1351   AST 22   ALT 21   BILITOT 0.5   ALKPHOS 71     No results for input(s): INR in the last 72 hours. No results for input(s): Alferd Akshat in the last 72 hours. Urinalysis:      Lab Results   Component Value Date    NITRU Negative 02/09/2020    WBCUA 10-20 02/09/2020    BACTERIA 2+ 02/09/2020    RBCUA 10-20 02/09/2020    BLOODU LARGE 02/09/2020    SPECGRAV >=1.030 02/09/2020    GLUCOSEU Negative 02/09/2020       Radiology:       XR CHEST STANDARD (2 VW)   Final Result      1. No acute disease. Assessment/Plan  Patient is a 58-year-old male with past medical history metastatic lung cancer with metastasis to the brain, who has been brought to the hospital by family from nursing home secondary to increased confusion lethargy. 1. Acute Metabolic encephalopathy   - Etiology likely multifactorial - Possible infection (possible UTI), hypernatremia, dehydration,  narcotics, brain metastasis  - Started on BSA on admission  - IV hydration  - Psychoactive meds held  - Appears to be improved  - Continue management of reversible causes of encephalopathy which he has  - Keep NPO, aspiration precautions until oral safety verifiable. SLP eval: For MBS today  - Aspiration precautions  - Fall precautions        2. Hypernatremia  - Likely sec to low oral intake, water deficit  - 1.9 litre free water deficit  - Switch fluids to D5 W with goal Na about 143 in 24 hrs  - Will check BMP q6 for now  - SLP dysphagia eval to eval safety of oral intake      3.  Sepsis likely secondary to UTI  - Tachycardic with leucocytosis on presentation, with urinalysis supsicious for infection  - Started on BSA with Vancomycin and

## 2020-02-10 NOTE — PROGRESS NOTES
Clinical Pharmacy Progress Note    Admit date: 2/9/2020     Subjective/Objective:  Pt is a 65yom with PMHx that includes Stage IV lung cancer with brain mets who is admitted with AMS and UTI. Of note, patient was recently admitted to . [facility] 1/27 - 2/7 with PNA and sepsis, also found to have staph hominis in 1 of 2 blood culture bottles (repeat cultures negative). Interval update:  Discussing hospice vs ongoing care. Pharmacy is consulted to dose Vancomycin per Dr. Hung Rivas    Current antibiotics:  Zosyn 3.375g IV EI q8h - day #2  Vancomycin - Pharmacy to dose - day #2   1g IV q12h (2/9 - current)    Pt was on Vancomycin at recent admission at . [facility]. Per review of records in Care Everywhere:  · Pt was on Vancomycin 1.25g IV q12h, which resulted in a trough of 12.4mcg/mL. Repeat trough 2 days later was 21.1mcg/mL, but this was drawn just 7 hrs after prior dose (so actual 12-hr trough likely more like 16-17mcg/mL. However, still higher than prior trough indicating accumulation may have been starting to occur). SCr stable at 0.7 at that time. Recent Labs     02/09/20  1351 02/10/20  0550   * 148*   K 3.9 3.7   * 113*   CO2 19* 20*   BUN 33* 27*   CREATININE 0.8 0.7*   GLUCOSE 121* 90       Estimated Creatinine Clearance: 115 mL/min (A) (based on SCr of 0.7 mg/dL (L)).     Lab Results   Component Value Date    WBC 10.1 02/10/2020    HGB 11.3 (L) 02/10/2020    HCT 33.7 (L) 02/10/2020    .5 (H) 02/10/2020     02/10/2020       No results found for: PROTIME, INR    Height:  5' 11\" (180.3 cm)  Weight:  167 lb (75.8 kg)    Vancomycin Levels:  Trough  2/11 @ 10:00 = pending     Culture results:  Blood (2/9) = pending  Urine (2/9) = 50k yeast, no further work-up    Prophylaxis:  VTE:  Enoxaparin  GI:  Not indicated    Vaccination screening:  Pneumonia:  Up to date  Influenza:  Up to date    Assessment/Plan:  1)  AMS / UTI:  Zosyn + Vancomycin - day #2  · Zosyn -   Current dose remains appropriate based on indication and current renal function. Will continue to monitor and adjust as needed per [facility] Renal Dose Adjustment Policy. · Vancomycin - Pharmacy to dose  · Continue 1g IV q12h. · Trough has been ordered with dose due 2/11 10:00. Desired trough 10-15. · Clinical condition will be monitored closely, and levels will be ordered & dose adjustments made as appropriate.     Please call with questions--  Thanks--  Yasmeen Desai, PharmD, [address] [facility], [facility] or [phone] (wireless)  2/10/2020 4:02 PM

## 2020-02-10 NOTE — PROGRESS NOTES
Speech Language Pathology  DYSPHAGIA - MBS attempt    Pt brought to radiology, very lethargic. Not able to awaken pt sufficiently to participate. Pt opened eyes briefly and closed again and did not respond to questions. Spoke with Rn who states pt has been lethargic all morning. Recommend NPO due to lethargy. Recommend aggressive oral care with suction toothbrush and ice chips for comfort if alert. Will follow up next session and complete MBS as pt able     Relaliyah Parks M.S./[facility]-SLP #[id number]  Pg.  # [phone]

## 2020-02-10 NOTE — PLAN OF CARE
Nutrition Problem: Inadequate oral intake  Nutritional Goals: Patient will tolerate most appropriate form of nutrition therapy when initiated

## 2020-02-10 NOTE — CONSULTS
Clinical Pharmacy Consult Note    Admit date: 2/9/2020    Subjective/Objective:  Patient is a 71-year-old male with past medical history metastatic lung cancer with metastasis to the brain, who has been brought to the hospital by family from nursing home secondary to increased confusion lethargy. Patient was recently discharged from another facility where he was treated for sepsis and pneumonia. Pharmacy is consulted to dose Vancomycin per Dr. Domonique Morley    Pertinent Medications:  Vancomycin 1000mg IV q12h -- day # 1 (2/9 - current)  Zosyn 3.375g IV q8h -- day #1 (2/9 - current)     PERTINENT LABS:  Recent Labs     02/09/20  1351   *   K 3.9   *   CO2 19*   BUN 33*   CREATININE 0.8       Estimated Creatinine Clearance: 101 mL/min (based on SCr of 0.8 mg/dL). Recent Labs     02/09/20  1351   WBC 13.9*   HGB 14.0   HCT 42.7   .2*          Height:  5' 11\" (180.3 cm)  Weight: 167 lb (75.8 kg)    Micro:  Date Site Micro Susceptibility   2/9 urine pending    2/9 blood pending              Assessment/Plan:  1. Sepsis with Sirs likely secondary to UTI:  vancomycin day #1  Vancomycin  · Will start 1000mg IV q12h. Provides ~ 16.5 mg/kg and is based on a half-life elimination of 7.8 hours. · Patient was on vancomycin 1250mg IV q12h that resulted in a level of 21.2 mcg/mL at [facility] for treatment of CAP and had stapholococcus hominis in 1 or 2 bottles, repeats were negative. · Clinical pharmacist will follow-up in AM.  · Renal function will be monitored closely and dosing will be adjusted as appropriate. Please call with any questions. Thank you for consulting pharmacy!   Cristina Javier, PharmD, [facility]  2/9/2020 8:47 PM

## 2020-02-10 NOTE — PROGRESS NOTES
Physical Therapy/Occupational Therapy  No treatment  Attempted to see patient for PT/OT evaluation. Patient supine in bed upon arrival, daughter present. Per discussion with daughter, family is waiting to meet with Palliative Care and Hospice to determine treatment plan. Patient currently declining mobility and daughter requests therapy return at a later time/date. Will continue to follow. RN aware.     [address]

## 2020-02-11 ENCOUNTER — APPOINTMENT (OUTPATIENT)
Dept: GENERAL RADIOLOGY | Age: 65
DRG: 054 | End: 2020-02-11
Payer: COMMERCIAL

## 2020-02-11 LAB
ANION GAP SERPL CALCULATED.3IONS-SCNC: 13 MMOL/L (ref 3–16)
BUN BLDV-MCNC: 20 MG/DL (ref 7–20)
CALCIUM SERPL-MCNC: 8.9 MG/DL (ref 8.3–10.6)
CHLORIDE BLD-SCNC: 111 MMOL/L (ref 99–110)
CO2: 20 MMOL/L (ref 21–32)
CREAT SERPL-MCNC: 0.7 MG/DL (ref 0.8–1.3)
GFR AFRICAN AMERICAN: >60
GFR NON-AFRICAN AMERICAN: >60
GLUCOSE BLD-MCNC: 103 MG/DL (ref 70–99)
POTASSIUM SERPL-SCNC: 3.2 MMOL/L (ref 3.5–5.1)
SODIUM BLD-SCNC: 144 MMOL/L (ref 136–145)

## 2020-02-11 PROCEDURE — 74230 X-RAY XM SWLNG FUNCJ C+: CPT

## 2020-02-11 PROCEDURE — 6360000002 HC RX W HCPCS: Performed by: INTERNAL MEDICINE

## 2020-02-11 PROCEDURE — 2580000003 HC RX 258: Performed by: INTERNAL MEDICINE

## 2020-02-11 PROCEDURE — 1200000000 HC SEMI PRIVATE

## 2020-02-11 PROCEDURE — 80048 BASIC METABOLIC PNL TOTAL CA: CPT

## 2020-02-11 PROCEDURE — 92526 ORAL FUNCTION THERAPY: CPT

## 2020-02-11 RX ORDER — DEXTROSE MONOHYDRATE 50 MG/ML
INJECTION, SOLUTION INTRAVENOUS CONTINUOUS
Status: ACTIVE | OUTPATIENT
Start: 2020-02-11 | End: 2020-02-12

## 2020-02-11 RX ORDER — POTASSIUM CHLORIDE 7.45 MG/ML
10 INJECTION INTRAVENOUS
Status: COMPLETED | OUTPATIENT
Start: 2020-02-11 | End: 2020-02-11

## 2020-02-11 RX ADMIN — DEXTROSE MONOHYDRATE: 50 INJECTION, SOLUTION INTRAVENOUS at 19:04

## 2020-02-11 RX ADMIN — POTASSIUM CHLORIDE 10 MEQ: 7.46 INJECTION, SOLUTION INTRAVENOUS at 21:08

## 2020-02-11 RX ADMIN — POTASSIUM CHLORIDE 10 MEQ: 7.46 INJECTION, SOLUTION INTRAVENOUS at 19:04

## 2020-02-11 RX ADMIN — DEXTROSE MONOHYDRATE: 50 INJECTION, SOLUTION INTRAVENOUS at 03:59

## 2020-02-11 RX ADMIN — Medication 10 ML: at 09:51

## 2020-02-11 RX ADMIN — POTASSIUM CHLORIDE 10 MEQ: 7.46 INJECTION, SOLUTION INTRAVENOUS at 20:04

## 2020-02-11 RX ADMIN — ENOXAPARIN SODIUM 40 MG: 40 INJECTION SUBCUTANEOUS at 09:49

## 2020-02-11 RX ADMIN — Medication 10 ML: at 20:04

## 2020-02-11 ASSESSMENT — PAIN SCALES - GENERAL: PAINLEVEL_OUTOF10: 0

## 2020-02-11 NOTE — PROGRESS NOTES
Oncology consult called to Dr Alisha Quinn via perfect serv.  Electronically signed by Ryan Kelly RN on 2/11/2020 at 3:09 PM

## 2020-02-11 NOTE — CONSULTS
Infectious Diseases   Consult Note      Reason for Consult:  Encephalopathy, fungal   Requesting Physician:  Oscar Romero MD     Date of Admission: 2/9/2020  Subjective:   CHIEF COMPLAINT:  None given       HPI:   Julio Price is a 62yoM with history of metastatic lung cancer diagnosed 9/2019, s/p 4 cycles carbo/etoposide/atezolumab. Disruption in planned \"maintenance immunotherapy\" due to insurance issues. He has experienced significant and rapid functional decline                Recent admission 1/27-2/7/20 - admitted with confusion, weakness, fever, dx pneumonia/LLL infiltrate. There was no guiding micro data. Hospital course complicated by urinary retention. Was seen by ID and recommended a short course of po doxy    Admitted 2/9 from SNF with weakness, lethargy, poor oral intake. WBC was 13.9. Lactic acid wnl. CXR NACPD   Consistently afebrile on room air. UA from indwelling tripp with small LE, neg nitrite, 10-20 wbc on microscopy with yeast in culture. Currently getting vanc and cefepime    The patient is very lethargic and does not contribute to the history. Current abx:  Zosyn 3.375 q8  vanc 1g q12       Past Surgical History:       Diagnosis Date    Cancer [facility])          Procedure Laterality Date    HIP SURGERY         Social History:    TOBACCO:   reports that he has quit smoking. His smoking use included cigarettes. He smoked 1.00 pack per day. He has never used smokeless tobacco.  ETOH:   reports previous alcohol use. There is no history of illicit drug use or other significant epidemiologic exposures. Family History:   History reviewed. No pertinent family history. There is no family history of autoimmune diseases or significant infectious diseases.     Current Medications:    Current Facility-Administered Medications: dextrose 5 % solution, , Intravenous, Continuous  sodium chloride flush 0.9 % injection 10 mL, 10 mL, Intravenous, 2 times per day  sodium chloride flush 0.9 % injection 10 mL, 10 mL, Intravenous, PRN  magnesium hydroxide (MILK OF MAGNESIA) 400 MG/5ML suspension 30 mL, 30 mL, Oral, Daily PRN  ondansetron (ZOFRAN) injection 4 mg, 4 mg, Intravenous, Q6H PRN  enoxaparin (LOVENOX) injection 40 mg, 40 mg, Subcutaneous, Daily  acetaminophen (TYLENOL) tablet 650 mg, 650 mg, Oral, Q4H PRN  piperacillin-tazobactam (ZOSYN) 3.375 g in dextrose 5 % 100 mL IVPB extended infusion (mini-bag), 3.375 g, Intravenous, Q8H  vancomycin (VANCOCIN) 1,000 mg in dextrose 5 % 250 mL IVPB, 1,000 mg, Intravenous, Q12H    No Known Allergies     REVIEW OF SYSTEMS:    Unable to obtain       Objective:   PHYSICAL EXAM:      VITALS:  BP 99/68   Pulse 90   Temp 97.6 °F (36.4 °C) (Axillary)   Resp 16   Ht 5' 11\" (1.803 m)   Wt 167 lb (75.8 kg)   SpO2 97%   BMI 23.29 kg/m²      24HR INTAKE/OUTPUT:      Intake/Output Summary (Last 24 hours) at 2/10/2020 1932  Last data filed at 2/10/2020 1355  Gross per 24 hour   Intake 0 ml   Output 950 ml   Net -950 ml     CONSTITUTIONAL:  Thin, frail male. Very lethargic, rouses to voice but non-verbal and not following commands  HEENT: NCAT, PERRL, EOMI. Sclera white, conjunctiva pale. OP with dry mucosal membranes, limited view  NECK:  Supple, symmetrical, trachea midline  LUNGS:  Breathing non-labored. Upper lobes are clear   CARDIOVASCULAR:   RRR  ABDOMEN:  normal bowel sounds, soft, MUSCULOSKELETAL: No obvious misalignment or effusion of the joints. No clubbing, cyanosis of the digits. SKIN:  normal skin color, texture, turgor and no redness, warmth, or swelling.  No palpable nodules or stigmata of embolic phenomenon  NEUROLOGIC: nonfocal exam  ACCESS:  Port in place, accessed, no local inflammatory change      DATA:    Old records have been reviewed    CBC:  Recent Labs     02/09/20  1351 02/10/20  0550   WBC 13.9* 10.1   RBC 4.13* 3.29*   HGB 14.0 11.3*   HCT 42.7 33.7*    238   .2* 102.5*   MCH 33.9 34.3*   MCHC 32.8 33.5   RDW 15.2 14.9

## 2020-02-11 NOTE — PROCEDURES
INSTRUMENTAL SWALLOW REPORT  MODIFIED BARIUM SWALLOW/DC    NAME: Rocio Gonzáles   : 1955  MRN: [id number]       Date of Eval: 2020     Ordering Physician: Dr. Toni Dubon  Radiologist: Dr Alana Ba     Referring Diagnosis(es): Referring Diagnosis: UTI    Past Medical History:  has a past medical history of Cancer ([address] Past Surgical History:  has a past surgical history that includes hip surgery. Current Diet Solid Consistency: NPO  Current Diet Liquid Consistency: NPO  Type of Study: Initial MBS     Recent Chest Xray 2020      1. No acute disease.          Patient Complaints/Reason for Referral:  Rocio Gonzáles was referred for a MBS to assess the efficiency of his/her swallow function, assess for aspiration, and to make recommendations regarding safe dietary consistencies, effective compensatory strategies, and safe eating environment. Onset of problem:   Date of Onset: 2020    Behavior/Cognition/Vision/Hearing:  Behavior/Cognition: Alert; Cooperative    Impressions:  Pt exhibiting oral phase impairment , poor mastication and propulsion of soft solid bolus and very slow clearance of oral cavity. Pt required liquid wash to fully clear oral cavity. With liquid wash, min flash penetration x 1 occurred, with clearing. No further penetration or aspiration occurred with puree and multiple trials of thin via cup and straw, and no pharyngeal residue was identified. Daughter states pt has been consuming very soft foods for past 2 years due to very limited and poor dentition. Oral cavity also very dry from NPO status, which may have contributed. Discussed with daughter and decision made for dysphagia II minced and moist diet, as this appeared most similar to diet pt normally consumes. Treatment Dx and ICD 10: dysphagia   Patient Position: Lateral and Patient Degrees: 90  Consistencies Administered: Dysphagia Minced and Moist (Dysphagia II); Dysphagia Pureed (Dysphagia I); Thin straw;Thin teaspoon; Thin cup  Dysphagia Outcome Severity Scale: Level 5: Mild dysphagia- Distant supervision. May need one diet consistency restricted  Penetration-Aspiration Scale (PAS): 2 - Material enters the airway, remains above the vocal folds, and is ejected from the airway(x 1 min, flash with complete clearing)    Recommended Diet:  Solid consistency: Dysphagia Minced and Moist (Dysphagia II)  Liquid consistency: Thin  Liquid administration via: Straw;Cup  Medication administration: Meds in puree    Safe Swallow Protocol:  Supervision: Close  Compensatory Swallowing Strategies:   Small bites/sips  Upright as possible for all oral intake  Assist feed  Check for pocketing of food     Recommendations/Treatment  Requires SLP Intervention: no  D/C Recommendations: (no follow up for SLP indicated )  Recommended Exercises: n/a   Therapeutic Interventions: Patient/Family education;Oral care    Education: Images and recommendations were reviewed with pr and daughter following this exam.   Patient Education: pt and daughter educated to results of MBS  Patient Education Response: Verbalizes understanding    Prognosis  Prognosis for safe diet advancement: fair  Barriers to reach goals: (co morbidities)  Duration/Frequency of Treatment  Duration/Frequency of Treatment: n/a    Goals:    1- Pt will participate in MBS  2/11:  attempted this morning and pt declined study. Re-assessed bedside with daughter present and now wishes to proceed with MBS, 2/11.   2/11: goal met    2- The patient Lucrecia Haider will verbalize/demonstrate understanding of dysphagia recommendations  2/10: pt educated to purpose of visit and recommendation for MBS. Pt agreeable and stated understanding  Cont goal  2/11: Educated pt and daughter extensively to purpose of visit, s/s of aspiration and concern if aspiration occurs. As family is deciding on possible hospice services, discussed eating for quality of life as well.  Discussed possible outcomes of study. Daughter would like to proceed with MBS, pt now agreeable. Cont goal  2/11: educated pt and daughter to results of MBS. Daughter states pt has been consuming very soft foods for past 2 years due to very limited and poor dentition. Discussed with daughter and decision made for dysphagia II minced and moist diet, as this appeared most similar to diet pt normally consumes. Emphasized importance of sitting fully upright with all PO and checking for oral residue or pocketing. Daughter stated good understanding  Goal met    New goal:  3- Pt will participate in repeat BSE  2/11: pt reassessed bedside as pt declined MBS this morning. Pt with weak volitional cough, oral cavity remains very dry. RN has been attempting oral care. Pt presented with ice chips, water via cup/straw and puree, as well as soft solids. No immediate reflexive cough noted, though weak cough occurred intermittently throughout session. Difficult to determine whether related to aspiration. Daughter states pt did have pneumonia 2 weeks ago when she was at an OSH. As above, daughter desires MBS be completed, and pt now agreeable   Goal met    Oral Preparation / Oral Phase  Pt exhibiting oral phase impairment , poor mastication and propulsion of soft solid bolus and very slow clearance of oral cavity. Pt required liquid wash to fully clear oral cavity. This pt baseline according to daughter, see discussion above in goal 2    Pharyngeal Phase  Pt required liquid wash to fully clear oral cavity of soft solid bolus. With liquid wash, min flash penetration x 1 occurred, with clearing. No further penetration or aspiration occurred with puree and multiple trials of thin via cup and straw, and no pharyngeal residue was identified.       Esophageal Phase  n/a    Pain   Patient Currently in Pain: No    Therapy Time:   Individual Concurrent Group Co-treatment   Time In 1240         Time Out 1305         Minutes 25            Plan:  Recommended diet: dysphagia II minced and moist/thin liquids  Dc recommendation: no follow up indicated. Pt baseline diet    ZAHEER Carrasco M.S./[facility]-SLP #[id number]  Pg.  # [phone]  Needs met prior to leaving radiology, results d/w ARUNA Mitchell  2/11/2020, 1:18 PM

## 2020-02-11 NOTE — PLAN OF CARE
Discussed nutrition with family due to diet order however unable to remain awake this afternoon. Daughter stated understanding. Will attempt when pt is more alert. Daughter agreeable. Skin remains same as upon admission. Turning and air mattress being completed.

## 2020-02-11 NOTE — PROGRESS NOTES
Hospital Medicine   Progress Note    PCP: Merlinda Hawking    Date of Admission: 2/9/2020    Chief Complaint: Lethargy, confusion       60-year-old male with past medical history metastatic lung cancer with metastasis to the brain, who has been brought to the hospital by family from nursing home secondary to increased confusion lethargy. According to the patient's family at bedside patient has not been eating well for the past 2 days. Patient was recently discharged from another facility where he was treated for sepsis and pneumonia. Patient was retaining urine and he had been inserted Dunlap. According to the family patient has become more confused lately and has not been communicating effectively. UA was suggestive of UTI on admission. Interval HPI  Seen and examined  Daughter at bedside    He denies any new complaints. Answers questions, but not not accurate some times. Medications: Reviewed         PHYSICAL EXAM     /73   Pulse 79   Temp 97.3 °F (36.3 °C) (Oral)   Resp 18   Ht 5' 11\" (1.803 m)   Wt 167 lb (75.8 kg)   SpO2 96%   BMI 23.29 kg/m²     General appearance:  No apparent distress, cooperative. HEENT:  Normal cephalic, atraumatic without obvious deformity. Conjunctivae/corneas clear. Neck: Supple, with full range of motion. No cervical lymphadenopathy  Respiratory:  Normal respiratory effort. Clear to auscultation, bilaterally without Rales/Wheezes/Rhonchi. Cardiovascular:  Regular rate and rhythm with normal S1/S2 without murmurs, rubs or gallops. Abdomen: Soft, non-tender, non-distended, normal bowel sounds. Musculoskeletal:  No edema bilaterally. No tenderness on palpation   Skin: no rash visible  Neurologic:  A and O x 1. Right paresis. Gurgly speech.    Peripheral Pulses: +2 palpable, equal bilaterally       Labs:     Recent Labs     02/09/20  1351 02/10/20  0550   WBC 13.9* 10.1   HGB 14.0 11.3*   HCT 42.7 33.7*    238     Recent Labs 02/09/20  1351 02/10/20  0550 02/11/20  0520   * 148* 144   K 3.9 3.7 3.2*   * 113* 111*   CO2 19* 20* 20*   BUN 33* 27* 20   CREATININE 0.8 0.7* 0.7*   CALCIUM 9.9 9.0 8.9   PHOS  --  4.1  --      Recent Labs     02/09/20  1351   AST 22   ALT 21   BILITOT 0.5   ALKPHOS 71     No results for input(s): INR in the last 72 hours. No results for input(s): Vladimir Shall in the last 72 hours. Urinalysis:      Lab Results   Component Value Date    NITRU Negative 02/09/2020    WBCUA 10-20 02/09/2020    BACTERIA 2+ 02/09/2020    RBCUA 10-20 02/09/2020    BLOODU LARGE 02/09/2020    SPECGRAV >=1.030 02/09/2020    GLUCOSEU Negative 02/09/2020       Radiology:     FL MODIFIED BARIUM SWALLOW W VIDEO   Final Result   FINDINGS/impression: There is slight delay in the oral phase of the swallowing with poor initiation. Mild pharyngeal retention of the puree and the semisolid Nutrigrain bar is seen. One episode of flash penetration was seen with the Nutrigrain bar . For detailed discussion please refer to the speech pathologist report from the same day. XR CHEST STANDARD (2 VW)   Final Result      1. No acute disease. Assessment/Plan  Patient is a 60-year-old male with past medical history metastatic lung cancer with metastasis to the brain, who has been brought to the hospital by family from nursing home secondary to increased confusion lethargy. 1. Acute Metabolic encephalopathy   - Etiology likely multifactorial - hypernatremia, dehydration,  Narcotics/psychoactive meds, brain metastasis  - Infection was felt a possibility on admission, but appears less likely with negative urine cx data  - Started on BSA on admission: With negative cx data, discontinued at this time  - IV hydration: Transition to oral intake, dysphagia diet. - Psychoactive meds held on admission (Cymbalta, PRN Seroquel, Sertraline).    - Appears to be improved  - Continue management of eval encephalopathy, and/or f/u imaging to re-eval cancer burden to oncology. - Palliative care consulted. Daughter interested in speaking to Hospice but will make decision regarding how aggressive to continue care, depending on how he does in the next few days. We discussed that with his Cancer diagnosis, even without current encephalopathy, reasonable to have Hospice on board in any case. - Daughter wishs to speak to oncology prior to deciding regarding Hospice. DVT Prophylaxis: Lovenox  Diet: DIET DYSPHAGIA MINCED AND MOIST;  Code Status: DNR-CC    PT/OT Eval Status: Order PT/OT eval for dispo planning. Disposition - Admitted from SNF. Likely LTC with Hospice  versus hospice IPU at discharge, possibly within 24 hrs, pending oncology input and daughter's decision re Hospice.         Geo Khan MD

## 2020-02-11 NOTE — PROGRESS NOTES
Pt has been unable to complete oral intake due to lethargic this afternoon. Nurse has attempted to wake pt but he returns to sleep within moments. Pt has been turned q 2 with pillows and remains on pressure relieving bed.   Electronically signed by Jolie Watt RN on 2/11/2020 at 6:54 PM

## 2020-02-11 NOTE — PROGRESS NOTES
Speech Language Pathology  Facility/Department: [facility] [address] SURGERY  Dysphagia Daily Treatment Note    NAME: Mariah Esparza  : 1955  MRN: [id number]    Patient Diagnosis(es):   Patient Active Problem List    Diagnosis Date Noted    Acute metabolic encephalopathy 2884    UTI (urinary tract infection) 2020     Allergies: No Known Allergies     Recent Chest Xray 2020      1. No acute disease.            Previous MBS - none    Chart reviewed. Medical Diagnosis: UTI  Treatment Diagnosis: dysphagia    BSE Impression 2/10/2020  Pt awakened easily to verbal stim, followed simple commands. Responses to questions were slow. Volitional cough is weak, oral structures grossly intact, no asymmetry noted, oral cavity very dry. Presented pt with puree and thin liquids as well as a hawa cracker. Intermittent cough after water trials via tsp and cup. Good swallow movement upon palpation of anterior neck. Pt with minimal dentition/poor oral hygiene, exhibited slow mastication of cracker, no oral residue. Recommend MBS to fully assess swallow function secondary to possible overt signs of aspiration as above, as well as medical status     MBS results - attempted this morning and pt declined study. Re-assessed bedside with daughter present and now wishes to proceed with MBS,     Pain: none reported    Current Diet : NPO    Treatment:  Pt seen bedside to address the following goals:  1- Pt will participate in  [address]  :  attempted this morning and pt declined study. Re-assessed bedside with daughter present and now wishes to proceed with MBS, .     2- The patient Yareli Prachi will verbalize/demonstrate understanding of dysphagia recommendations  2/10: pt educated to purpose of visit and recommendation for MBS. Pt agreeable and stated understanding  Cont goal  : Educated pt and daughter extensively to purpose of visit, s/s of aspiration and concern if aspiration occurs.   As family is deciding on possible hospice services, discussed eating for quality of life as well. Discussed possible outcomes of study. Daughter would like to proceed with MBS, pt now agreeable. Cont goal    New goal:  3- Pt will participate in repeat BSE  2/11: pt reassessed bedside as pt declined MBS this morning. Pt with weak volitional cough, oral cavity remains very dry. RN has been attempting oral care. Pt presented with ice chips, water via cup/straw and puree, as well as soft solids. No immediate reflexive cough noted, though weak cough occurred intermittently throughout session. Difficult to determine whether related to aspiration. Daughter states pt did have pneumonia 2 weeks ago when she was at an OSH. As above, daughter desires MBS be completed, and pt now agreeable   Goal met    Patient/Family/Caregiver Education:  As above    Compensatory Strategies:   TBD after MBS     Plan:  Continued daily Dysphagia treatment with goals per  plan of care. Diet recommendations:  TBD after MBS  MBS today - pt now agreeable   DC recommendation:  TBD after MBS  Treatment: 25  D/W nursing Kiana  Needs met prior to leaving room, call button in reach. Morenita De Luna M.S./[facility]-SLP #[id number]  Pg.  # [phone]  If patient is discharged prior to next treatment, this note will serve as the discharge summary

## 2020-02-11 NOTE — PROGRESS NOTES
BP on softer side overnight. Afebrile. Pt unable to communicate needs- responds to stimuli/opens eyes. On assessment, resting comfortably w eyes closed. No signs of acute distress. Speciality bed in place. Turned q2h as tolerated. No evidence of any new skin breakdown. Dunlap in place draining estela colored urine to gravity. NPO w IVF infusing. Bed alarm remains engaged. Bed in lowest position. Door remains open w frequent rounding. All needs met @ this time.  Will cont to monitor

## 2020-02-11 NOTE — CARE COORDINATION
CM following, facility list obtained from [employer], referrals made, spoke with son in pts room. Plan is to DC to facility with hospice. Jean Marie Montesinos from finance called [phone] she will fill out a pending medicaid.   Electronically signed by Teetee Duffy RN on 2/11/2020 at 6:30 PM  [phone]

## 2020-02-12 VITALS
SYSTOLIC BLOOD PRESSURE: 109 MMHG | HEIGHT: 71 IN | BODY MASS INDEX: 23.38 KG/M2 | OXYGEN SATURATION: 100 % | DIASTOLIC BLOOD PRESSURE: 75 MMHG | HEART RATE: 92 BPM | RESPIRATION RATE: 16 BRPM | WEIGHT: 167 LBS | TEMPERATURE: 96.9 F

## 2020-02-12 PROCEDURE — 96372 THER/PROPH/DIAG INJ SC/IM: CPT

## 2020-02-12 PROCEDURE — 96366 THER/PROPH/DIAG IV INF ADDON: CPT

## 2020-02-12 PROCEDURE — 97530 THERAPEUTIC ACTIVITIES: CPT

## 2020-02-12 PROCEDURE — 2580000003 HC RX 258: Performed by: INTERNAL MEDICINE

## 2020-02-12 PROCEDURE — 97162 PT EVAL MOD COMPLEX 30 MIN: CPT

## 2020-02-12 PROCEDURE — 97166 OT EVAL MOD COMPLEX 45 MIN: CPT

## 2020-02-12 PROCEDURE — 51702 INSERT TEMP BLADDER CATH: CPT

## 2020-02-12 PROCEDURE — 96361 HYDRATE IV INFUSION ADD-ON: CPT

## 2020-02-12 PROCEDURE — 96367 TX/PROPH/DG ADDL SEQ IV INF: CPT

## 2020-02-12 PROCEDURE — 6360000002 HC RX W HCPCS: Performed by: INTERNAL MEDICINE

## 2020-02-12 PROCEDURE — 96368 THER/DIAG CONCURRENT INF: CPT

## 2020-02-12 PROCEDURE — 97535 SELF CARE MNGMENT TRAINING: CPT

## 2020-02-12 RX ADMIN — ENOXAPARIN SODIUM 40 MG: 40 INJECTION SUBCUTANEOUS at 08:31

## 2020-02-12 RX ADMIN — Medication 10 ML: at 08:31

## 2020-02-12 NOTE — PROGRESS NOTES
Pt alert and oriented to self. Pt has been resting in bed. Pt denies any pain at this time. Denied feeling hungry and did not eat breakfast. Dunlap in place with adequate urine output. Family at bedside. Will continue to monitor.  Electronically signed by Carmen Reyes RN on 2/12/2020 at 11:38 AM

## 2020-02-12 NOTE — PROGRESS NOTES
AROM: Adequate for functional mobility   Strength RLE  Comment: Globally <3+/5 per pt's inability to perform functional mobility   Strength LLE  Comment: Globally <3+/5 per pt's inability to perform functional mobility      Sensation  Overall Sensation Status: WFL  Bed mobility  Supine to Sit: 2 Person assistance(Max x2 people)  Sit to Supine: 2 Person assistance(Max A x2 people)  Scooting: Dependent/Total(x2 people)  Transfers  Comment: Pt dizzy sitting EOB with poor sitting balance - pt unsafe to attempt OOB mobility at this time. Balance  Posture: Fair  Sitting - Static: Poor  Sitting - Dynamic: Poor(Max A to maintain sitting balance at EOB)      PT evaluation and treatment initiated. Treatment included bed mobility training as well as patient education.     Plan   Plan  Times per week: 2-5  Times per day: Daily  Current Treatment Recommendations: Strengthening, ROM, Transfer Training, Balance Training, Endurance Training, Gait Training, Functional Mobility Training, Safety Education & Training, Home Exercise Program  Safety Devices  Type of devices: Gait belt, Call light within reach, Left in bed, Nurse notified, Bed alarm in place    AM-PAC Score  AM-PAC Inpatient Mobility Raw Score : 6 (02/12/20 1031)  AM-PAC Inpatient T-Scale Score : 23.55 (02/12/20 1031)  Mobility Inpatient CMS 0-100% Score: 100 (02/12/20 1031)  Mobility Inpatient CMS G-Code Modifier : CN (02/12/20 1031)          Goals  Short term goals  Time Frame for Short term goals: d/c  Short term goal 1: sup<>sit min A  Short term goal 2: sit<>stand assessed when appropriate  Short term goal 3: bed to chair assessed when appropriate   Patient Goals   Patient goals : none stated       Therapy Time   Individual Concurrent Group Co-treatment   Time In 0942         Time Out 1015         Minutes 33           Timed Code Treatment Minutes: 18      Total Treatment Minutes:  33    If the patient is discharged before the next treatment session, this note will serve as the discharge summary.      Kevin Kendall, PT, DPT [id number]

## 2020-02-12 NOTE — DISCHARGE SUMMARY
with goal Na about 143 in 24 hrs. 144 this AM  - SLP dysphagia eval to eval safety of oral intake: S/p MBS: start dysphagia diet, and wean off IVF  - Monitor sodium        3. Possible Sepsis secondary to UTI  - Had tachycardic with leucocytosis on presentation, with urinalysis suspicious for infection: Was started on BSA with Vancomycin and [address] stay for Pneumonia per daughter  - Dunlap placed during recent hospital stay, which was removed, but still showed retention, replaced  - Repeat CXR: no evidence of pnemonia  - Urine cx: no signif growth  - Sepsis less likely,  ruled out at this time. - Appears tachycardia and mild leucocytosis on admission may potantially have alternative explanation, including dehydration.  - Agree with withholding further antibx at this time        4. Possible UTI  - Concern for UTI, related to Dunlap catheter on admission  - Had Dunlap placed in recent hospitalization for urinary retention which was removed, but still showed retention, replaced. - Urine cx: no signif growth  - Urine growing fungus, likely contaminant  - UTI less likely, ruled out at this time. - Agree with withholding further antibx at this time        5. Lung cancer stage IV with brain mets  - S/p chemo radiation  - Last Chemo in Dec. Discussion for possible immunotherapy going forward was last plan per daughter.   - His brain mets felt to possibly play role in his presentation. To what extent, difficult to say at this time without repeat imaging.  - Planned o/p Oncol f/u on Wednesday with plan for repeat imaging  - Daughter states he was doing ok up until about 2 weeks ago  - Goal is to attempt to correct reversible causes   - Oncology consulted: will defer decisions regarding imaging to eval encephalopathy, and/or f/u imaging to re-eval cancer burden to oncology. - Palliative care consulted.  Daughter interested in speaking to Hospice but will make decision regarding how aggressive to continue

## 2020-02-12 NOTE — PLAN OF CARE
Problem: Falls - Risk of:  Goal: Will remain free from falls  Description  Will remain free from falls  Outcome: Completed  Note:   Pt has been free from fall during shift. Pt has called out appropriately for needs. Call light in reach. Bed in lowest position. Bed alarm on. Will continue to monitor    Goal: Absence of physical injury  Description  Absence of physical injury  Outcome: Completed     Problem: Risk for Impaired Skin Integrity  Goal: Tissue integrity - skin and mucous membranes  Description  Structural intactness and normal physiological function of skin and  mucous membranes. Outcome: Completed  Note:   Pt has redness in indigo area and scrotum. Dunlap in place for urinary retention. Pt on specialty bed. Pt checked, changed and turned frequently. Will continue to monitor. Problem: Pain:  Description  Pain management should include both nonpharmacologic and pharmacologic interventions.   Goal: Pain level will decrease  Description  Pain level will decrease  Outcome: Completed  Goal: Control of acute pain  Description  Control of acute pain  Outcome: Completed  Goal: Control of chronic pain  Description  Control of chronic pain  Outcome: Completed     Problem: Nutrition  Goal: Optimal nutrition therapy  Outcome: Completed     Problem: Skin Integrity:  Goal: Will show no infection signs and symptoms  Description  Will show no infection signs and symptoms  Outcome: Completed

## 2020-02-12 NOTE — PROGRESS NOTES
Hospital Medicine   Progress Note    PCP: Paras Díaz    Date of Admission: 2/9/2020    Chief Complaint: Lethargy, confusion       52-year-old male with past medical history metastatic lung cancer with metastasis to the brain, who has been brought to the hospital by family from nursing home secondary to increased confusion lethargy. According to the patient's family at bedside patient has not been eating well for the past 2 days. Patient was recently discharged from another facility where he was treated for sepsis and pneumonia. Patient was retaining urine and he had been inserted Dunlap. According to the family patient has become more confused lately and has not been communicating effectively. UA was suggestive of UTI on admission. Interval HPI  Seen and examined  Daughter at bedside    He denies any new complaints. Answers questions, but not not accurate some times. Medications: Reviewed         PHYSICAL EXAM     /75   Pulse 92   Temp 96.9 °F (36.1 °C) (Axillary)   Resp 16   Ht 5' 11\" (1.803 m)   Wt 167 lb (75.8 kg)   SpO2 100%   BMI 23.29 kg/m²     General appearance:  No apparent distress, cooperative. HEENT:  Normal cephalic, atraumatic without obvious deformity. Conjunctivae/corneas clear. Neck: Supple, with full range of motion. No cervical lymphadenopathy  Respiratory:  Normal respiratory effort. Clear to auscultation, bilaterally without Rales/Wheezes/Rhonchi. Cardiovascular:  Regular rate and rhythm with normal S1/S2 without murmurs, rubs or gallops. Abdomen: Soft, non-tender, non-distended, normal bowel sounds. Musculoskeletal:  No edema bilaterally. No tenderness on palpation   Skin: no rash visible  Neurologic:  A and O x 1. Right paresis. Gurgly speech.    Peripheral Pulses: +2 palpable, equal bilaterally       Labs:     Recent Labs     02/10/20  0550   WBC 10.1   HGB 11.3*   HCT 33.7*        Recent Labs     02/10/20  0550 02/11/20  0520   * and he continues to be encephalopathic, then this is likely due to his cancer.  - Fall precautions        2. Hypernatremia  - Likely sec to low oral intake, water deficit  - 1.9 litre free water deficit 2/10/20  - Switched fluids to D5 W with goal Na about 143 in 24 hrs. 144 this AM  - SLP dysphagia eval to eval safety of oral intake: S/p MBS: start dysphagia diet, and wean off IVF  - Monitor sodium      3. Possible Sepsis secondary to UTI  - Had tachycardic with leucocytosis on presentation, with urinalysis suspicious for infection: Was started on BSA with Vancomycin and [address] stay for Pneumonia per daughter  - Dunlap placed during recent hospital stay, which was removed, but still showed retention, replaced  - Repeat CXR: no evidence of pnemonia  - Urine cx: no signif growth  - Sepsis less likely,  ruled out at this time. - Appears tachycardia and mild leucocytosis on admission may potantially have alternative explanation, including dehydration.  - Agree with withholding further antibx at this time      4. Possible UTI  - Concern for UTI, related to Dunlap catheter on admission  - Had Dunlap placed in recent hospitalization for urinary retention which was removed, but still showed retention, replaced. - Urine cx: no signif growth  - Urine growing fungus, likely contaminant  - UTI less likely, ruled out at this time. - Agree with withholding further antibx at this time      5. Lung cancer stage IV with brain mets  - S/p chemo radiation  - Last Chemo in Dec. Discussion for possible immunotherapy going forward was last plan per daughter.   - His brain mets felt to possibly play role in his presentation.  To what extent, difficult to say at this time without repeat imaging.  - Planned o/p Oncol f/u on Wednesday with plan for repeat imaging  - Daughter states he was doing ok up until about 2 weeks ago  - Goal is to attempt to correct reversible causes   - Oncology consulted: will defer decisions regarding imaging to eval encephalopathy, and/or f/u imaging to re-eval cancer burden to oncology. - Palliative care consulted. Daughter interested in speaking to Hospice but will make decision regarding how aggressive to continue care, depending on how he does in the next few days. We discussed that with his Cancer diagnosis, even without current encephalopathy, reasonable to have Hospice on board in any case. - Daughter wishes to speak to oncology prior to deciding regarding Hospice. - Appears has been seen by Oncologist, (notes pending), and daughter notified of recommendation.   - For discharge with Hospice as soon as set up. Discussed with case mgt. DVT Prophylaxis: Lovenox  Diet: DIET DYSPHAGIA MINCED AND MOIST;  Code Status: DNR-CC    PT/OT Eval Status: Order PT/OT eval for dispo planning. Disposition - Admitted from SNF. Likely LTC with Hospice  versus hospice IPU at discharge. Ready for discharge as soon as set up. Discussed with pxt, daughter and Case mgt.         Alberto Feng MD

## 2020-02-12 NOTE — PROGRESS NOTES
Nicotine patch noted on right deltoid dated 2/7/20 was removed.  Electronically signed by Kobe Callaway RN on 2/12/2020 at 5:36 PM

## 2020-02-12 NOTE — PROGRESS NOTES
Palliative Care Chart Review  and Check in Note:     NAME:  Guicho Anderson Date: 2/9/2020  Hospital Day:  Hospital Day: 4   Current Code status: DNR-CC    Palliative care is continuing to following [facility] for symptom management,  and goals of care discussion as needed. Patient's chart reviewed today 2/12/20. Met with pt's daughter Galina Hernandez at the bedside. The pt is more alert today but remains delirious. Galina Hernandez states they met with [facility] yesterday and started the application for medicaid so that he can go to a long term care facility. Galina Hernandez is still hoping to meet with oncology before transferring the pt to hospice. The following are the currently established goals/code status, and Symptom management.      Goals of care: Continue with current management, pt's family plans to transition him to hospice      Code status: [facility]      Discharge plan: Likely long term care facility with hospice    [facility]  [phone]

## 2020-02-12 NOTE — DISCHARGE INSTR - COC
Continuity of Care Form    Patient Name: Elisabeth Coker   :  1955  MRN:  [id number]    Admit date:  2020  Discharge date:  2020    [facility]   [facility]vance Directives:   [facility] Documentation     Date/Time Healthcare Directive Type of Healthcare Directive Copy in [address] [facility] [address] Agent's Name Healthcare Agent's Phone Number    20 1936  Unknown, patient unable to respond due to medical condition  --  --  --  --  --          Admitting Physician:  Rudi Hernández MD  PCP: Elisha Morales    Discharging Nurse: Francine Syed 23 Unit/Room#: 9718/5387-66  Discharging Unit Phone Number: [phone]    Emergency Contact:   Extended Emergency Contact Information  Primary Emergency Contact: young, [address] [facility], [address] Phone: [phone]  Relation: Child  Secondary Emergency Contact: [address] [place] [address], [address] [place] [address],[address] Phone: [phone]  Relation: Child   needed?  No    Past Surgical History:  Past Surgical History:   Procedure Laterality Date    HIP SURGERY         Immunization History:   Immunization History   Administered Date(s) Administered    Influenza Virus Vaccine 2020    Pneumococcal Conjugate 13-valent ([id number]) 11/15/2016    Pneumococcal Polysaccharide ([id number]) 2020    Tdap (Boostrix, Adacel) 2016       Active Problems:  Patient Active Problem List   Diagnosis Code    UTI (urinary tract infection) N39.0    Acute metabolic encephalopathy C46.74       Isolation/Infection:   Isolation          No Isolation        Patient Infection Status     None to display          Nurse Assessment:  Last Vital Signs: /75   Pulse 92   Temp 96.9 °F (36.1 °C) (Axillary)   Resp 16   Ht 5' 11\" (1.803 m)   Wt 167 lb (75.8 kg)   SpO2 100%   BMI 23.29 kg/m²     Last documented pain score (0-10 scale): Pain Level: 0  Last Weight:   Wt Readings from Last 1 Encounters:   20 167 lb (75.8 kg)     Mental Status: disoriented    IV Access:  - Port A Cath    Nursing Mobility/ADLs:  Walking   Dependent  Transfer  Dependent  Bathing  Dependent  Dressing  Dependent  Toileting  Dependent  Feeding  Assisted  Med Admin  Assisted  Med Delivery   prefers mixed with applesauce    Wound Care Documentation and Therapy:        Elimination:  Continence:   · Bowel: No  · Bladder: No  Urinary Catheter: Insertion Date: 2/10/2020   Colostomy/Ileostomy/Ileal Conduit: No       Date of Last BM:     Intake/Output Summary (Last 24 hours) at 2/12/2020 1819  Last data filed at 2/12/2020 1740  Gross per 24 hour   Intake 100 ml   Output 2050 ml   Net -1950 ml     I/O last 3 completed shifts: In: 100 [P.O.:90; I.V.:10]  Out: 1875 [Urine:1875]    Safety Concerns: At Risk for Falls and Aspiration Risk    Impairments/Disabilities:      None    Nutrition Therapy:  Current Nutrition Therapy:   - Oral Diet:  Dysphagia 3 advanced    Routes of Feeding: Oral  Liquids: No Restrictions  Daily Fluid Restriction: no  Last Modified Barium Swallow with Video (Video Swallowing Test): done on 2/11/20    Treatments at the Time of Hospital Discharge:   Respiratory Treatments:   Oxygen Therapy:  is not on home oxygen therapy. Ventilator:    - No ventilator support    Rehab Therapies:   Weight Bearing Status/Restrictions: No weight bearing restirctions  Other Medical Equipment (for information only, NOT a DME order):     Other Treatments:    Patient's personal belongings (please select all that are sent with patient):  None    RN SIGNATURE:  Electronically signed by Italo Morales RN on 2/12/20 at 6:24 PM    CASE MANAGEMENT/SOCIAL WORK SECTION    Inpatient Status Date:     Readmission Risk Assessment Score:  Readmission Risk              Risk of Unplanned Readmission:        14           Discharging to Facility/ Agency   · Name:   · Address:  · Phone:  · Fax:    Dialysis Facility (if applicable)   · Name:  · Address:  · Dialysis Schedule:  · Phone:  · Fax:    Case Manager/ signature: {Esignature:[id number]}    PHYSICIAN SECTION    Prognosis: {Prognosis:6754155030}    Condition at Discharge: [address] Patient Condition:[id number]}    Rehab Potential (if transferring to Rehab): {Prognosis:2892793494}    Recommended Labs or Other Treatments After Discharge: ***    Physician Certification: I certify the above information and transfer of Alina Blas  is necessary for the continuing treatment of the diagnosis listed and that he requires {Admit to Appropriate Level of Care:01791} for {GREATER/LESS:147278499} 30 days.      Update Admission H&P: {CHP DME Changes in TADEP:046427022}    PHYSICIAN SIGNATURE:  {Esignature:[id number]}

## 2020-02-12 NOTE — PROGRESS NOTES
Walk-in shower  Bathroom Toilet: Standard  Home Equipment: Lili Fallon  ADL Assistance: Independent  Homemaking Assistance: Independent  Ambulation Assistance: Independent(with cane recently)  Transfer Assistance: Independent  Active : No  Additional Comments: Pt's daughter providing above information. Pt's daughter reports pt has a few recent falls. Objective   Vision: Within Functional Limits  Hearing: Within functional limits    Orientation  Overall Orientation Status: Impaired  Orientation Level: Disoriented to person;Disoriented to place; Disoriented to time;Disoriented to situation     Balance  Sitting Balance: Maximum assistance(EOB static sitting x10 minutes)  Standing Balance: Unable to assess(comment)(unsafe to attempt at this time due to deficits in strength/sitting balance. )    ADL  LE Dressing: Dependent/Total  Toileting: Dependent/Total     Tone RUE  RUE Tone: Normotonic  Tone LUE  LUE Tone: Normotonic  Coordination  Movements Are Fluid And Coordinated: No  Coordination and Movement description: Fine motor impairments;Gross motor impairments;Decreased speed;Right UE;Left UE;Decreased accuracy     Bed mobility  Supine to Sit: 2 Person assistance(Max x2 people)  Sit to Supine: 2 Person assistance(Max A x2 people)  Scooting: Dependent/Total(x2 people)        Cognition  Overall Cognitive Status: Exceptions  Arousal/Alertness: Appropriate responses to stimuli  Following Commands: Follows one step commands with increased time; Follows one step commands with repetition  Attention Span: Difficulty attending to directions; Attends with cues to redirect; Difficulty dividing attention  Memory: Decreased recall of biographical Information;Decreased short term memory;Decreased long term memory;Decreased recall of recent events  Safety Judgement: Decreased awareness of need for assistance;Decreased awareness of need for safety  Problem Solving: Assistance required to identify errors made;Assistance required to note will serve as the discharge summary    Clary Monge, OTR/L 3801

## 2020-02-12 NOTE — CARE COORDINATION
Case Management Assessment            Discharge Note                    Date / Time of Note: 2/12/2020 4:47 PM                  Discharge Note Completed by: Miles Doan    Patient Name: Navid Otero   YOB: 1955  Diagnosis: UTI (urinary tract infection) [N39.0]  UTI (urinary tract infection) [R49.9]  Acute metabolic encephalopathy [X19.18]   Date / Time: 2/9/2020  1:12 PM    Current PCP: Gabino Lopez patient: No    Hospitalization in the last 30 days: Yes    Advance Directives:  Code Status: [facility] DNR form completed and on chart: Yes    Financial:  Payor: Anabella Barahona / Plan: Sarwat Meehan KIAN / Product Type: *No Product type* /      Pharmacy:  No Pharmacies [address] [place] [address] medications?:    Assistance provided by Case Management: None at this time    Does patient want to participate in local refill/ meds to beds program?:      Meds To Beds General Rules:  1. Can ONLY be done Monday- Friday between 8:30am-5pm  2. Prescription(s) must be in pharmacy by 3pm to be filled same day  3. Copy of patient's insurance/ prescription drug card and patient face sheet must be sent along with the prescription(s)  4. Cost of Rx cannot be added to hospital bill. If financial assistance is needed, please contact unit  or ;  or  CANNOT provide pharmacy voucher for patients co-pays  5.  Patients can then  the prescription on their way out of the hospital at discharge, or pharmacy can deliver to the bedside if staff is available. (payment due at time of pick-up or delivery - cash, check, or card accepted)     Able to afford home medications/ co-pay costs: Yes    ADLS:  Current PT AM-PAC Score: 6 /24  Current OT AM-PAC Score: 8 /24      DISCHARGE Disposition: Home with Hospice: [facility] [address] [place] [address]     LOC at discharge: Not Applicable  JERONIMO Completed: Not Indicated    Notification completed in HENS/PAS?:  Not Applicable    IMM Completed:   Not Indicated    Transportation:  Transportation PLAN for discharge: EMS transportation   Mode of Transport: Ambulance stretcher - BLS  Reason for medical transport: Other: Hospice pt  Name of [facility]: PTS  Phone:[phone]  Time of Transport: 2000    Transport form completed: Yes    Home Care:  [address] ordered at discharge: Not [address]: Not Applicable  Orders faxed: No    Durable Medical Equipment:  DME Provider: none  Equipment obtained during hospitalization:     Home Oxygen and Respiratory Equipment:  Oxygen needed at discharge?: Not [address]: Not Applicable  Portable tank available for discharge?: Not Indicated    Dialysis:  Dialysis patient: No    Dialysis Center:  Not Applicable    Hospice Services:  Location: Inpatient Unit  Agency: [facility]  Phone: [phone]    Consents signed: Yes      Additional CM Notes: Pt from home alone at extended stay, recent stay at [facility] to [facility], family refuse pt to return to [facility], signed consent with [facility] and will be going to [place] [address] in [address] [place] [address] for Transition of Care is related to the following treatment goals of UTI (urinary tract infection) [N39.0]  UTI (urinary tract infection) [U80.7]  Acute metabolic encephalopathy [H64.30]    The Patient and/or patient representative Ari Jones and his family were provided with a choice of provider and agrees with the discharge plan Yes    Freedom of choice list was provided with basic dialogue that supports the patient's individualized plan of care/goals and shares the quality data associated with the providers.  Not Indicated    Care Transitions patient: No    Ashlie Morris RN  [facility] [employer]  Case Management Department  Ph: [phone]  Fax: [phone]

## 2020-02-13 LAB
BLOOD CULTURE, ROUTINE: NORMAL
CULTURE, BLOOD 2: NORMAL

## 2020-02-13 NOTE — CONSULTS
[facility]    The patient was seen on 2/12/2020 for a second opinion relative to the appropriateness of being admitted to the inpatient hospice services. The patient's daughter apparently wanted to make sure that she was making decision. The patient is currently a patient of Dr. Uyen Marcum of [facility]. He is unable to give a cogent history as he is confused. He was slightly lethargic but easily aroused. A review of the electronic record shows that his oncologist is seen him as late as December 26, 2019. 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 His diagnosis was secured by a liver biopsy on 9/13/2019. He received whole brain radiation therapy through 9/26/2019 and then begun on systemic chemotherapy and immunotherapy on 10/1/2019. Specifically, he was treated with carboplatinum, etoposide, and atezolizumab. He received 4 cycles of therapy through 12/3/2019 and was continued on maintenance immunotherapy. Despite a response to his initial chemotherapy he continued to experience weight loss. At that time he had a repeat staging study of the chest which was concerning for progression of disease but the appropriate comparison had not been done per Dr. Shabbir Brown note. The patient was subsequently admitted to [facility] for failure to thrive. He had also recently been discharged from another facility where he been treated for severe sepsis and pneumonia. He was increasingly confused and he was found to have a metabolic encephalopathy. Initially it was thought that he had a UTI but cultures were negative. He was also found to be hyponatremic. Given his poor performance status and marked decline the question was raised as to whether or not further imaging and treatment beyond progression would be warranted.  His most recent imaging from [facility] dates back to 1/27/2020 when a CTA of the chest and a CT of the brain showed stable metastatic disease. The rest of the patient's past medical history, social history, family history, meds, allergies were reviewed in the electronic medical record. The patient could offer no history    On examination the patient is chronically ill-appearing. He is cachectic and somewhat confused. He could not tell me what disease he had or why he was in the hospital.  He could not name his doctors. He was somewhat lethargic but readily arousable. HEENT: Sclera were nonicteric  Lungs breath sounds were diminished bilaterally  Cardiovascular: The patient had a regular rate and rhythm  Abdomen: Normal bowel sounds soft  Extremities: There is no clubbing cyanosis edema  Neuro: The patient is somewhat lethargic but easily aroused. He is extremely confused. His exam is otherwise nonfocal.    Labs show the patient to be mildly hypokalemic with a potassium of 3.2, he has normal renal function, his white count is 10.18 low 11.3 hematocrit 33.7 and platelet count 387. Cultures were negative. Lactate was normal    Impression:    Under the best of circumstances the patient has a septic clinical picture get this seems less likely given the normal lactate level and negative cultures. Furthermore, there was no significant response to antibiotics. Even if this was infectious the patient does have widespread metastatic small cell lung cancer and his prognosis, given his poor performance status, is very poor. If 1 were to take a very aggressive approach he would be continued on antibiotics and monitored for progress. I do not think that there is any role for rehab. If he was to improve and/or recover then you could consider continuing therapy following a complete restaging. On the other hand, the patient does have a terminal diagnosis of small cell carcinoma of the lung.   He is not understanding of the interventions being done to him right now and procedures could in fact cause significant hardship. Palliation of symptoms and palliative care seems most appropriate. I agree with the recommendation for hospice. There was no family at bedside. I did discuss the case with his nurse. Tran Tirado. Marquis Hodgkins, M.D., MPH  Co-Chair, Department of  Clinical [address] [phone]  [place] [phone]. [email or web address]    \"Participating in a clinical trial is the first step to fighting cancer; not the last.\"

## 2020-03-13 PROBLEM — N39.0 UTI (URINARY TRACT INFECTION): Status: RESOLVED | Noted: 2020-02-09 | Resolved: 2020-03-13
